# Patient Record
Sex: FEMALE | Race: BLACK OR AFRICAN AMERICAN | Employment: UNEMPLOYED | URBAN - METROPOLITAN AREA
[De-identification: names, ages, dates, MRNs, and addresses within clinical notes are randomized per-mention and may not be internally consistent; named-entity substitution may affect disease eponyms.]

---

## 2017-05-23 ENCOUNTER — HOSPITAL ENCOUNTER (EMERGENCY)
Age: 37
Discharge: HOME | End: 2017-05-23
Attending: EMERGENCY MEDICINE
Payer: MEDICAID

## 2017-05-23 ENCOUNTER — APPOINTMENT (OUTPATIENT)
Dept: CT IMAGING | Age: 37
End: 2017-05-23
Attending: EMERGENCY MEDICINE
Payer: MEDICAID

## 2017-05-23 VITALS
BODY MASS INDEX: 44.16 KG/M2 | WEIGHT: 240 LBS | SYSTOLIC BLOOD PRESSURE: 126 MMHG | HEART RATE: 73 BPM | RESPIRATION RATE: 16 BRPM | DIASTOLIC BLOOD PRESSURE: 71 MMHG | TEMPERATURE: 99.1 F | HEIGHT: 62 IN | OXYGEN SATURATION: 100 %

## 2017-05-23 DIAGNOSIS — G93.2 IIH (IDIOPATHIC INTRACRANIAL HYPERTENSION): ICD-10-CM

## 2017-05-23 DIAGNOSIS — G43.009 NONINTRACTABLE MIGRAINE, UNSPECIFIED MIGRAINE TYPE: Primary | ICD-10-CM

## 2017-05-23 LAB
ALBUMIN SERPL BCP-MCNC: 3.5 G/DL (ref 3.5–5)
ALBUMIN/GLOB SERPL: 0.9 {RATIO} (ref 1.1–2.2)
ALP SERPL-CCNC: 62 U/L (ref 45–117)
ALT SERPL-CCNC: 25 U/L (ref 12–78)
ANION GAP BLD CALC-SCNC: 5 MMOL/L (ref 5–15)
AST SERPL W P-5'-P-CCNC: 14 U/L (ref 15–37)
BASOPHILS # BLD AUTO: 0 K/UL (ref 0–0.1)
BASOPHILS # BLD: 0 % (ref 0–1)
BILIRUB SERPL-MCNC: 0.6 MG/DL (ref 0.2–1)
BUN SERPL-MCNC: 9 MG/DL (ref 6–20)
BUN/CREAT SERPL: 13 (ref 12–20)
CALCIUM SERPL-MCNC: 8.8 MG/DL (ref 8.5–10.1)
CHLORIDE SERPL-SCNC: 105 MMOL/L (ref 97–108)
CO2 SERPL-SCNC: 30 MMOL/L (ref 21–32)
CREAT SERPL-MCNC: 0.71 MG/DL (ref 0.55–1.02)
EOSINOPHIL # BLD: 0 K/UL (ref 0–0.4)
EOSINOPHIL NFR BLD: 1 % (ref 0–7)
ERYTHROCYTE [DISTWIDTH] IN BLOOD BY AUTOMATED COUNT: 13.3 % (ref 11.5–14.5)
GLOBULIN SER CALC-MCNC: 3.8 G/DL (ref 2–4)
GLUCOSE SERPL-MCNC: 90 MG/DL (ref 65–100)
HCT VFR BLD AUTO: 40.4 % (ref 35–47)
HGB BLD-MCNC: 13.3 G/DL (ref 11.5–16)
LYMPHOCYTES # BLD AUTO: 22 % (ref 12–49)
LYMPHOCYTES # BLD: 1.8 K/UL (ref 0.8–3.5)
MCH RBC QN AUTO: 30 PG (ref 26–34)
MCHC RBC AUTO-ENTMCNC: 32.9 G/DL (ref 30–36.5)
MCV RBC AUTO: 91.2 FL (ref 80–99)
MONOCYTES # BLD: 0.4 K/UL (ref 0–1)
MONOCYTES NFR BLD AUTO: 5 % (ref 5–13)
NEUTS SEG # BLD: 5.9 K/UL (ref 1.8–8)
NEUTS SEG NFR BLD AUTO: 72 % (ref 32–75)
PLATELET # BLD AUTO: 221 K/UL (ref 150–400)
POTASSIUM SERPL-SCNC: 3.8 MMOL/L (ref 3.5–5.1)
PROT SERPL-MCNC: 7.3 G/DL (ref 6.4–8.2)
RBC # BLD AUTO: 4.43 M/UL (ref 3.8–5.2)
SODIUM SERPL-SCNC: 140 MMOL/L (ref 136–145)
WBC # BLD AUTO: 8.1 K/UL (ref 3.6–11)

## 2017-05-23 PROCEDURE — 96375 TX/PRO/DX INJ NEW DRUG ADDON: CPT

## 2017-05-23 PROCEDURE — 36415 COLL VENOUS BLD VENIPUNCTURE: CPT | Performed by: EMERGENCY MEDICINE

## 2017-05-23 PROCEDURE — 74011250636 HC RX REV CODE- 250/636: Performed by: EMERGENCY MEDICINE

## 2017-05-23 PROCEDURE — 85025 COMPLETE CBC W/AUTO DIFF WBC: CPT | Performed by: EMERGENCY MEDICINE

## 2017-05-23 PROCEDURE — 96374 THER/PROPH/DIAG INJ IV PUSH: CPT

## 2017-05-23 PROCEDURE — 80053 COMPREHEN METABOLIC PANEL: CPT | Performed by: EMERGENCY MEDICINE

## 2017-05-23 PROCEDURE — 80201 ASSAY OF TOPIRAMATE: CPT | Performed by: EMERGENCY MEDICINE

## 2017-05-23 PROCEDURE — 99283 EMERGENCY DEPT VISIT LOW MDM: CPT

## 2017-05-23 PROCEDURE — 70450 CT HEAD/BRAIN W/O DYE: CPT

## 2017-05-23 RX ORDER — DIPHENHYDRAMINE HYDROCHLORIDE 50 MG/ML
25 INJECTION, SOLUTION INTRAMUSCULAR; INTRAVENOUS
Status: COMPLETED | OUTPATIENT
Start: 2017-05-23 | End: 2017-05-23

## 2017-05-23 RX ORDER — ACETAZOLAMIDE 500 MG/1
500 CAPSULE, EXTENDED RELEASE ORAL 2 TIMES DAILY
Qty: 20 CAP | Refills: 0 | Status: SHIPPED | OUTPATIENT
Start: 2017-05-23 | End: 2017-06-02

## 2017-05-23 RX ORDER — TOPIRAMATE 50 MG/1
50 TABLET, FILM COATED ORAL 2 TIMES DAILY
Qty: 20 TAB | Refills: 0 | Status: ON HOLD | OUTPATIENT
Start: 2017-05-23 | End: 2018-05-02

## 2017-05-23 RX ORDER — MORPHINE SULFATE 4 MG/ML
4 INJECTION, SOLUTION INTRAMUSCULAR; INTRAVENOUS
Status: COMPLETED | OUTPATIENT
Start: 2017-05-23 | End: 2017-05-23

## 2017-05-23 RX ORDER — SODIUM CHLORIDE 0.9 % (FLUSH) 0.9 %
5-10 SYRINGE (ML) INJECTION AS NEEDED
Status: DISCONTINUED | OUTPATIENT
Start: 2017-05-23 | End: 2017-05-23 | Stop reason: HOSPADM

## 2017-05-23 RX ORDER — ONDANSETRON 2 MG/ML
4 INJECTION INTRAMUSCULAR; INTRAVENOUS
Status: COMPLETED | OUTPATIENT
Start: 2017-05-23 | End: 2017-05-23

## 2017-05-23 RX ORDER — SODIUM CHLORIDE 0.9 % (FLUSH) 0.9 %
5-10 SYRINGE (ML) INJECTION EVERY 8 HOURS
Status: DISCONTINUED | OUTPATIENT
Start: 2017-05-23 | End: 2017-05-23 | Stop reason: HOSPADM

## 2017-05-23 RX ORDER — HYDROCODONE BITARTRATE AND ACETAMINOPHEN 5; 325 MG/1; MG/1
1 TABLET ORAL
Qty: 10 TAB | Refills: 0 | Status: SHIPPED | OUTPATIENT
Start: 2017-05-23 | End: 2017-10-05

## 2017-05-23 RX ADMIN — DIPHENHYDRAMINE HYDROCHLORIDE 25 MG: 50 INJECTION INTRAMUSCULAR; INTRAVENOUS at 14:47

## 2017-05-23 RX ADMIN — Medication 10 ML: at 14:48

## 2017-05-23 RX ADMIN — ONDANSETRON 4 MG: 2 INJECTION INTRAMUSCULAR; INTRAVENOUS at 14:44

## 2017-05-23 RX ADMIN — Medication 10 ML: at 14:51

## 2017-05-23 RX ADMIN — MORPHINE SULFATE 4 MG: 4 INJECTION, SOLUTION INTRAMUSCULAR; INTRAVENOUS at 14:44

## 2017-05-23 NOTE — ED NOTES
Patient has been instructed that they have been given Morphine which contains opioids, benzodiazepines, or other sedating drugs. Patient is aware that they  will need to refrain from driving or operating heavy machinery after taking this medication. Patient also instructed that they need to avoid drinking alcohol and using other products containing opioids, benzodiazepines, or other sedating drugs. Patient verbalized understanding.

## 2017-05-23 NOTE — ED NOTES
Emergency Department Nursing Plan of Care       The Nursing Plan of Care is developed from the Nursing assessment and Emergency Department Attending provider initial evaluation. The plan of care may be reviewed in the ED Provider note.     The Plan of Care was developed with the following considerations:   Patient / Family readiness to learn indicated by:verbalized understanding  Persons(s) to be included in education: patient  Barriers to Learning/Limitations:No    [address] Santos, RN    5/23/2017   2:06 PM

## 2017-05-23 NOTE — DISCHARGE INSTRUCTIONS
Migraine Headache: Care Instructions  Your Care Instructions  Migraines are painful, throbbing headaches that often start on one side of the head. They may cause nausea and vomiting and make you sensitive to light, sound, or smell. Without treatment, migraines can last from 4 hours to a few days. Medicines can help prevent migraines or stop them after they have started. Your doctor can help you find which ones work best for you. Follow-up care is a key part of your treatment and safety. Be sure to make and go to all appointments, and call your doctor if you are having problems. It's also a good idea to know your test results and keep a list of the medicines you take. How can you care for yourself at home? · Do not drive if you have taken a prescription pain medicine. · Rest in a quiet, dark room until your headache is gone. Close your eyes, and try to relax or go to sleep. Don't watch TV or read. · Put a cold, moist cloth or cold pack on the painful area for 10 to 20 minutes at a time. Put a thin cloth between the cold pack and your skin. · Use a warm, moist towel or a heating pad set on low to relax tight shoulder and neck muscles. · Have someone gently massage your neck and shoulders. · Take your medicines exactly as prescribed. Call your doctor if you think you are having a problem with your medicine. You will get more details on the specific medicines your doctor prescribes. · Be careful not to take pain medicine more often than the instructions allow. You could get worse or more frequent headaches when the medicine wears off. To prevent migraines  · Keep a headache diary so you can figure out what triggers your headaches. Avoiding triggers may help you prevent headaches. Record when each headache began, how long it lasted, and what the pain was like.  (Was it throbbing, aching, stabbing, or dull?) Write down any other symptoms you had with the headache, such as nausea, flashing lights or dark spots, or sensitivity to bright light or loud noise. Note if the headache occurred near your period. List anything that might have triggered the headache. Triggers may include certain foods (chocolate, cheese, wine) or odors, smoke, bright light, stress, or lack of sleep. · If your doctor has prescribed medicine for your migraines, take it as directed. You may have medicine that you take only when you get a migraine and medicine that you take all the time to help prevent migraines. ¨ If your doctor has prescribed medicine for when you get a headache, take it at the first sign of a migraine, unless your doctor has given you other instructions. ¨ If your doctor has prescribed medicine to prevent migraines, take it exactly as prescribed. Call your doctor if you think you are having a problem with your medicine. · Find healthy ways to deal with stress. Migraines are most common during or right after stressful times. Take time to relax before and after you do something that has caused a migraine in the past.  · Try to keep your muscles relaxed by keeping good posture. Check your jaw, face, neck, and shoulder muscles for tension. Try to relax them. When you sit at a desk, change positions often. And make sure to stretch for 30 seconds each hour. · Get plenty of sleep and exercise. · Eat meals on a regular schedule. Avoid foods and drinks that often trigger migraines. These include chocolate, alcohol (especially red wine and port), aspartame, monosodium glutamate (MSG), and some additives found in foods (such as hot dogs, tijer[place], cold cuts, aged cheeses, and pickled foods). · Limit caffeine. Don't drink too much coffee, tea, or soda. But don't quit caffeine suddenly. That can also give you migraines. · Do not smoke or allow others to smoke around you. If you need help quitting, talk to your doctor about stop-smoking programs and medicines. These can increase your chances of quitting for good.   · If you are taking birth control pills or hormone therapy, talk to your doctor about whether they are triggering your migraines. When should you call for help? Call 911 anytime you think you may need emergency care. For example, call if:  · You have signs of a stroke. These may include:  ¨ Sudden numbness, paralysis, or weakness in your face, arm, or leg, especially on only one side of your body. ¨ Sudden vision changes. ¨ Sudden trouble speaking. ¨ Sudden confusion or trouble understanding simple statements. ¨ Sudden problems with walking or balance. ¨ A sudden, severe headache that is different from past headaches. Call your doctor now or seek immediate medical care if:  · You have new or worse nausea and vomiting. · You have a new or higher fever. · Your headache gets much worse. Watch closely for changes in your health, and be sure to contact your doctor if:  · You are not getting better after 2 days (48 hours). Where can you learn more? Go to http://jesusProteoTechjanet.info/. Enter E674 in the search box to learn more about \"Migraine Headache: Care Instructions. \"  Current as of: October 14, 2016  Content Version: 11.2  © 3487-0432 [employer]. Care instructions adapted under license by [employer] (which disclaims liability or warranty for this information). If you have questions about a medical condition or this instruction, always ask your healthcare professional. [address] any warranty or liability for your use of this information. Learning About Pseudotumor Cerebri  What is pseudotumor cerebri? Pseudotumor cerebri (say \"okx-gjq-UGR-kunal SA--kasey\") is an increase in pressure of the fluid that surrounds the brain. Your doctor may also call the condition \"idiopathic intracranial hypertension. \" Normally, this clear fluid acts like a buffer to protect the brain. It is called cerebrospinal fluid, or CSF.  It's not clear what makes the CSF pressure rise. Some medicines may cause it. Sleep apnea, obesity, and anemia may also play a part. The pressure may build over time. The rising pressure can make the optic nerve swell. The optic nerve is at the back of the eye. It carries visual information from the eye to the brain. The swelling may damage the nerve and lead to permanent loss of some or all of the eyesight. There are several symptoms of rising CSF pressure. Some symptoms may be present all the time. Some might come and go. Symptoms include:  · Severe headaches. They sometimes come with nausea and vomiting. The pain may be centered behind the eyes. · A hissing or roaring sound in the ears that keeps time with your heartbeat. · Problems with vision. You may not be able to see well at the edge of your field of view. You may also lose center vision. It may happen now and then, in one or both eyes, and last for a few seconds at a time. The word \"pseudotumor\" may sound scary. But it's not a brain tumor. The condition gets its name because the pressure inside the skull can mimic what happens when a person has a tumor. How is it treated? · If you are taking medicines that could be raising your CSF pressure, your doctor may change your medicines. · You may get medicines to help your body make less CSF. This can help lower the pressure around the brain. · Your doctor may also give you medicine for headaches. · If sleep apnea, obesity, or anemia may be causing the CSF pressure to rise, your doctor may treat those problems. · If your vision is getting worse, you may have surgery to make a small opening on the optic nerve to reduce swelling. · Your doctor may implant small tubes inside the skull to drain the extra fluid. This helps lower the pressure around the brain. Follow-up care is a key part of your treatment and safety. Be sure to make and go to all appointments, and call your doctor if you are having problems.  It's also a good idea to know your test results and keep a list of the medicines you take. Where can you learn more? Go to http://jesus-janet.info/. Enter 545 [id number] [id number] in the search box to learn more about \"Learning About Pseudotumor Cerebri. \"  Current as of: May 23, 2016  Content Version: 11.2  © 8995-2433 [employer]. Care instructions adapted under license by [employer] (which disclaims liability or warranty for this information). If you have questions about a medical condition or this instruction, always ask your healthcare professional. [place] [address] any warranty or liability for your use of this information.

## 2017-05-23 NOTE — ED NOTES
Patient (s)  given copy of dc instructions and 3 script(s). Patient(s)  verbalized understanding of instructions and script (s). Patient given a current medication reconciliation form and verbalized understanding of their medications. Patient (s) verbalized understanding of the importance of discussing medications with  his or her physician or clinic when they follow up. Patient alert and oriented and in no acute distress. Pt verbalizes pain scale of 3 out of 10. Patient discharged home ambulatory with friend driving her.   Patient declined a wheelchair at discharge

## 2017-05-23 NOTE — ED PROVIDER NOTES
HPI Comments: John Javed is a 40 y.o. female with hx IIH, who presents ambulatory to the ED c/o worsening HA x 3 days. She reports associated dizziness, eye tearing, and photophobia. She states her current pain is similar to her hx of IIH HA. She has taken Tylenol for pain, without relief. She is followed by neurology at [facility], but could not schedule an appointment until tomorrow. She specifically denies any fevers, chills, nausea, vomiting, chest pain, shortness of breath, rash, diarrhea, sweating or weight loss. PCP: [address] [place] [address] MD Felipe  Soc Hx: -tobacco (former smoker), +EtOH    There are no other complaints, changes or physical findings at this time. The history is provided by the patient. Past Medical History:   Diagnosis Date    Anxiety     Hypertension     Other ill-defined conditions     Hydrocephalus - mild    Psychiatric disorder     Anxiety       Past Surgical History:   Procedure Laterality Date    HX  SECTION  ,03    x2    HX HEENT      LP to relieve right eye pressure    HX HYSTERECTOMY      Partial    HX LAPAROSCOPIC SUPRACERVICAL HYSTERECTOMY  14    HX OTHER SURGICAL  2013    [employer]    HX TUBAL LIGATION           Family History:   Problem Relation Age of Onset    Diabetes Maternal Grandmother     Stroke Maternal Grandmother        Social History     Social History    Marital status: SINGLE     Spouse name: N/A    Number of children: N/A    Years of education: N/A     Occupational History    Not on file.      Social History Main Topics    Smoking status: Former Smoker     Quit date: 3/27/2012    Smokeless tobacco: Not on file      Comment: two times weekly    Alcohol use No      Comment: 4/month    Drug use: No    Sexual activity: Yes     Partners: Male     Birth control/ protection: Surgical      Comment: Tubal Ligation     Other Topics Concern    Not on file     Social History Narrative         ALLERGIES: Review of patient's allergies indicates no known allergies. Review of Systems   Constitutional: Negative. Negative for activity change, appetite change, chills, fatigue, fever and unexpected weight change. HENT: Negative for congestion, hearing loss, rhinorrhea, sneezing and voice change. Eyes: Positive for photophobia. Negative for pain. +tearing   Respiratory: Negative. Negative for apnea, cough, choking, chest tightness and shortness of breath. Cardiovascular: Negative. Negative for chest pain and palpitations. Gastrointestinal: Negative. Negative for abdominal distention, abdominal pain, blood in stool, diarrhea, nausea and vomiting. Genitourinary: Negative. Negative for difficulty urinating, flank pain, frequency and urgency. No discharge   Musculoskeletal: Negative. Negative for arthralgias, back pain, myalgias and neck stiffness. Skin: Negative. Negative for color change and rash. Neurological: Positive for dizziness and headaches. Negative for seizures, syncope, speech difficulty, weakness and numbness. Hematological: Negative for adenopathy. Psychiatric/Behavioral: Negative. Negative for agitation, behavioral problems, dysphoric mood and suicidal ideas. The patient is not nervous/anxious. All other systems reviewed and are negative. Vitals:    05/23/17 1313   BP: 117/69   Pulse: 97   Resp: 18   Temp: 99.1 °F (37.3 °C)   SpO2: 96%   Weight: 108.9 kg (240 lb)   Height: 5' 2\" (1.575 m)            Physical Exam   Constitutional: She is oriented to person, place, and time. She appears well-developed and well-nourished. HENT:   Head: Normocephalic and atraumatic. Mouth/Throat: Oropharynx is clear and moist. No oropharyngeal exudate. Eyes: Conjunctivae and EOM are normal. Pupils are equal, round, and reactive to light. Right eye exhibits no discharge. Left eye exhibits no discharge. Neck: Normal range of motion. Neck supple.    Cardiovascular: Normal rate, regular rhythm and intact distal pulses. Exam reveals no gallop and no friction rub. No murmur heard. Pulmonary/Chest: Effort normal and breath sounds normal. No respiratory distress. She has no wheezes. She has no rales. She exhibits no tenderness. Abdominal: Soft. Bowel sounds are normal. She exhibits no distension and no mass. There is no tenderness. There is no rebound and no guarding. Musculoskeletal: Normal range of motion. She exhibits no edema. Lymphadenopathy:     She has no cervical adenopathy. Neurological: She is alert and oriented to person, place, and time. No cranial nerve deficit. Coordination normal.   Skin: Skin is warm and dry. No rash noted. No erythema. Psychiatric: Her mood appears anxious. Nursing note and vitals reviewed. MDM  Number of Diagnoses or Management Options  IIH (idiopathic intracranial hypertension):   Nonintractable migraine, unspecified migraine type:   Diagnosis management comments: Migraine HA, anxiety, IIH       Amount and/or Complexity of Data Reviewed  Clinical lab tests: ordered and reviewed  Tests in the radiology section of CPT®: ordered and reviewed  Review and summarize past medical records: yes  Independent visualization of images, tracings, or specimens: yes      ED Course     PROGRESS NOTE:  4:00 PM  Pt reevaluated. Pt given medications for hx of IIH, reports sx have improved. Pt advised to discuss with neurology in the morning about any new medications to start. Written by Beatrice Coley ED Scribe, as dictated by Floridalma Campos MD    Procedures    LABORATORY TESTS:  Recent Results (from the past 12 hour(s))   CBC WITH AUTOMATED DIFF    Collection Time: 05/23/17  2:39 PM   Result Value Ref Range    WBC 8.1 3.6 - 11.0 K/uL    RBC 4.43 3.80 - 5.20 M/uL    HGB 13.3 11.5 - 16.0 g/dL    HCT 40.4 35.0 - 47.0 %    MCV 91.2 80.0 - 99.0 FL    MCH 30.0 26.0 - 34.0 PG    MCHC 32.9 30.0 - 36.5 g/dL    RDW 13.3 11.5 - 14.5 %    PLATELET 182 764 - 263 K/uL NEUTROPHILS 72 32 - 75 %    LYMPHOCYTES 22 12 - 49 %    MONOCYTES 5 5 - 13 %    EOSINOPHILS 1 0 - 7 %    BASOPHILS 0 0 - 1 %    ABS. NEUTROPHILS 5.9 1.8 - 8.0 K/UL    ABS. LYMPHOCYTES 1.8 0.8 - 3.5 K/UL    ABS. MONOCYTES 0.4 0.0 - 1.0 K/UL    ABS. EOSINOPHILS 0.0 0.0 - 0.4 K/UL    ABS. BASOPHILS 0.0 0.0 - 0.1 K/UL   METABOLIC PANEL, COMPREHENSIVE    Collection Time: 05/23/17  2:39 PM   Result Value Ref Range    Sodium 140 136 - 145 mmol/L    Potassium 3.8 3.5 - 5.1 mmol/L    Chloride 105 97 - 108 mmol/L    CO2 30 21 - 32 mmol/L    Anion gap 5 5 - 15 mmol/L    Glucose 90 65 - 100 mg/dL    BUN 9 6 - 20 MG/DL    Creatinine 0.71 0.55 - 1.02 MG/DL    BUN/Creatinine ratio 13 12 - 20      GFR est AA >60 >60 ml/min/1.73m2    GFR est non-AA >60 >60 ml/min/1.73m2    Calcium 8.8 8.5 - 10.1 MG/DL    Bilirubin, total 0.6 0.2 - 1.0 MG/DL    ALT (SGPT) 25 12 - 78 U/L    AST (SGOT) 14 (L) 15 - 37 U/L    Alk. phosphatase 62 45 - 117 U/L    Protein, total 7.3 6.4 - 8.2 g/dL    Albumin 3.5 3.5 - 5.0 g/dL    Globulin 3.8 2.0 - 4.0 g/dL    A-G Ratio 0.9 (L) 1.1 - 2.2         IMAGING RESULTS:  CT HEAD WO CONT   Final Result   CT Results  (Last 48 hours)               05/23/17 1503  CT HEAD WO CONT Final result    Impression:  IMPRESSION: No acute findings. Narrative:  EXAM:  CT HEAD WO CONT       INDICATION:   headache, 3 day history, dizziness, history of idiopathic   intracranial hypertension, photophobia, visual disturbance       COMPARISON: None. TECHNIQUE: Unenhanced CT of the head was performed using 5 mm images. Brain and   bone windows were generated. CT dose reduction was achieved through use of a   standardized protocol tailored for this examination and automatic exposure   control for dose modulation. FINDINGS:   The ventricles and sulci are normal in size, shape and configuration and   midline. There is no significant white matter disease.  There is no intracranial   hemorrhage, extra-axial collection, mass, mass effect or midline shift. The   basilar cisterns are open. No acute infarct is identified. The bone windows   demonstrate no abnormalities. The visualized portions of the paranasal sinuses   and mastoid air cells are clear. The patient has an empty sella, which would   correlate with her history of idiopathic intracranial hypertension. Gevena Celine MEDICATIONS GIVEN:  Medications   sodium chloride (NS) flush 5-10 mL (10 mL IntraVENous Given 17 1451)   sodium chloride (NS) flush 5-10 mL (10 mL IntraVENous Canceled Entry 17 1449)   diphenhydrAMINE (BENADRYL) injection 25 mg (25 mg IntraVENous Given 17 1447)   morphine injection 4 mg (4 mg IntraVENous Given 17 1444)   ondansetron (ZOFRAN) injection 4 mg (4 mg IntraVENous Given 17 1444)       IMPRESSION:  1. Nonintractable migraine, unspecified migraine type    2. IIH (idiopathic intracranial hypertension)        PLAN:  1. Current Discharge Medication List      START taking these medications    Details   acetaZOLAMIDE SR (DIAMOX SEQUELS) 500 mg capsule Take 1 Cap by mouth two (2) times a day for 10 days. Qty: 20 Cap, Refills: 0      HYDROcodone-acetaminophen (NORCO) 5-325 mg per tablet Take 1 Tab by mouth every six (6) hours as needed for Pain. Max Daily Amount: 4 Tabs. Qty: 10 Tab, Refills: 0         CONTINUE these medications which have CHANGED    Details   topiramate (TOPAMAX) 50 mg tablet Take 1 Tab by mouth two (2) times a day for 10 days.   Qty: 20 Tab, Refills: 0         STOP taking these medications       ibuprofen 100 mg tablet Comments:   Reason for Stoppin.   Follow-up Information     Follow up With Details Comments [facility] In 1 day   [facility] [address]    [facility]  As needed, If symptoms worsen [address]        Return to ED if worse     DISCHARGE NOTE  4:03 PM  The patient has been re-evaluated and is ready for discharge. Reviewed available results, diagnosis, and discharge instructions with patient. Pt has conveyed understanding and agreement with the diagnosis and plan. Pt agrees to F/U as recommended, or return to the ED if their sxs worsen. Written by Lindsey Acevedo, ED Scribe, as dictated by Angel Handy MD.    This note is prepared by Lindsey Acevedo acting as Scribe for Angel Gunderson. Alexandre Handy, [address] [place] [address]. Alexandre Handy MD: The scribe's documentation has been prepared under my direction and personally reviewed by me in its entirety. I confirm that the note above accurately reflects all work, treatment, procedures, and medical decision making performed by me.

## 2017-05-25 LAB — TOPIRAMATE SERPL-MCNC: NORMAL UG/ML (ref 2–25)

## 2017-10-05 ENCOUNTER — HOSPITAL ENCOUNTER (EMERGENCY)
Age: 37
Discharge: HOME | End: 2017-10-05
Attending: EMERGENCY MEDICINE | Admitting: EMERGENCY MEDICINE
Payer: MEDICAID

## 2017-10-05 VITALS
TEMPERATURE: 99.1 F | SYSTOLIC BLOOD PRESSURE: 125 MMHG | WEIGHT: 239.5 LBS | OXYGEN SATURATION: 100 % | DIASTOLIC BLOOD PRESSURE: 76 MMHG | HEART RATE: 80 BPM | BODY MASS INDEX: 44.07 KG/M2 | RESPIRATION RATE: 16 BRPM | HEIGHT: 62 IN

## 2017-10-05 DIAGNOSIS — R51.9 NONINTRACTABLE EPISODIC HEADACHE, UNSPECIFIED HEADACHE TYPE: Primary | ICD-10-CM

## 2017-10-05 LAB
AMPHET UR QL SCN: NEGATIVE
APPEARANCE UR: CLEAR
BACTERIA URNS QL MICRO: NEGATIVE /HPF
BARBITURATES UR QL SCN: NEGATIVE
BENZODIAZ UR QL: NEGATIVE
BILIRUB UR QL: NEGATIVE
CANNABINOIDS UR QL SCN: NEGATIVE
COCAINE UR QL SCN: NEGATIVE
COLOR UR: ABNORMAL
DRUG SCRN COMMENT,DRGCM: NORMAL
EPITH CASTS URNS QL MICRO: ABNORMAL /LPF
GLUCOSE UR STRIP.AUTO-MCNC: NEGATIVE MG/DL
HCG UR QL: NEGATIVE
HGB UR QL STRIP: NEGATIVE
KETONES UR QL STRIP.AUTO: 40 MG/DL
LEUKOCYTE ESTERASE UR QL STRIP.AUTO: NEGATIVE
METHADONE UR QL: NEGATIVE
NITRITE UR QL STRIP.AUTO: NEGATIVE
OPIATES UR QL: NEGATIVE
PCP UR QL: NEGATIVE
PH UR STRIP: 5.5 [PH] (ref 5–8)
PROT UR STRIP-MCNC: NEGATIVE MG/DL
RBC #/AREA URNS HPF: ABNORMAL /HPF (ref 0–5)
SP GR UR REFRACTOMETRY: 1.02 (ref 1–1.03)
UA: UC IF INDICATED,UAUC: ABNORMAL
UROBILINOGEN UR QL STRIP.AUTO: 0.2 EU/DL (ref 0.2–1)
WBC URNS QL MICRO: ABNORMAL /HPF (ref 0–4)

## 2017-10-05 PROCEDURE — 99284 EMERGENCY DEPT VISIT MOD MDM: CPT

## 2017-10-05 PROCEDURE — 81001 URINALYSIS AUTO W/SCOPE: CPT | Performed by: EMERGENCY MEDICINE

## 2017-10-05 PROCEDURE — 74011250636 HC RX REV CODE- 250/636: Performed by: EMERGENCY MEDICINE

## 2017-10-05 PROCEDURE — 81025 URINE PREGNANCY TEST: CPT

## 2017-10-05 PROCEDURE — 80307 DRUG TEST PRSMV CHEM ANLYZR: CPT | Performed by: EMERGENCY MEDICINE

## 2017-10-05 PROCEDURE — 96375 TX/PRO/DX INJ NEW DRUG ADDON: CPT

## 2017-10-05 PROCEDURE — 96374 THER/PROPH/DIAG INJ IV PUSH: CPT

## 2017-10-05 PROCEDURE — 96361 HYDRATE IV INFUSION ADD-ON: CPT

## 2017-10-05 RX ORDER — PROCHLORPERAZINE EDISYLATE 5 MG/ML
10 INJECTION INTRAMUSCULAR; INTRAVENOUS
Status: COMPLETED | OUTPATIENT
Start: 2017-10-05 | End: 2017-10-05

## 2017-10-05 RX ORDER — BUTALBITAL, ACETAMINOPHEN AND CAFFEINE 300; 40; 50 MG/1; MG/1; MG/1
1 CAPSULE ORAL
Qty: 12 CAP | Refills: 0 | Status: ON HOLD | OUTPATIENT
Start: 2017-10-05 | End: 2018-05-02

## 2017-10-05 RX ORDER — SODIUM CHLORIDE 0.9 % (FLUSH) 0.9 %
5-10 SYRINGE (ML) INJECTION AS NEEDED
Status: DISCONTINUED | OUTPATIENT
Start: 2017-10-05 | End: 2017-10-05 | Stop reason: HOSPADM

## 2017-10-05 RX ORDER — SODIUM CHLORIDE 0.9 % (FLUSH) 0.9 %
5-10 SYRINGE (ML) INJECTION EVERY 8 HOURS
Status: DISCONTINUED | OUTPATIENT
Start: 2017-10-05 | End: 2017-10-05 | Stop reason: HOSPADM

## 2017-10-05 RX ORDER — KETOROLAC TROMETHAMINE 30 MG/ML
30 INJECTION, SOLUTION INTRAMUSCULAR; INTRAVENOUS
Status: COMPLETED | OUTPATIENT
Start: 2017-10-05 | End: 2017-10-05

## 2017-10-05 RX ORDER — DIPHENHYDRAMINE HYDROCHLORIDE 50 MG/ML
12.5 INJECTION, SOLUTION INTRAMUSCULAR; INTRAVENOUS
Status: COMPLETED | OUTPATIENT
Start: 2017-10-05 | End: 2017-10-05

## 2017-10-05 RX ADMIN — SODIUM CHLORIDE 1000 ML: 900 INJECTION, SOLUTION INTRAVENOUS at 14:16

## 2017-10-05 RX ADMIN — Medication 10 ML: at 15:12

## 2017-10-05 RX ADMIN — KETOROLAC TROMETHAMINE 30 MG: 30 INJECTION, SOLUTION INTRAMUSCULAR at 14:16

## 2017-10-05 RX ADMIN — DIPHENHYDRAMINE HYDROCHLORIDE 12.5 MG: 50 INJECTION INTRAMUSCULAR; INTRAVENOUS at 15:10

## 2017-10-05 RX ADMIN — PROCHLORPERAZINE EDISYLATE 10 MG: 5 INJECTION INTRAMUSCULAR; INTRAVENOUS at 15:12

## 2017-10-05 RX ADMIN — Medication 10 ML: at 15:10

## 2017-10-05 NOTE — ED PROVIDER NOTES
[facility]  EMERGENCY DEPARTMENT HISTORY AND PHYSICAL EXAM         Date of Service: 10/5/2017   Patient Name: Lashawn David   YOB: 1980  Medical Record Number: [id number]    History of Presenting Illness     Chief Complaint   Patient presents with    Generalized Body Aches     with dizziness x 4 days        History Provided By:  patient    Additional History:   Lashawn aDvid is a 40 y.o. female with PMhx significant for anxiety, HTN, and migraines who presents ambulatory to the ED with cc of progressively worsening HA x ~4 days. She also c/o aching lower back pain, left ear pain, mild nausea, and intermittent lightheadedness since onset. Pt states her symptoms are consistent with past episodes of migraine. She states she has been taking OTC medications without improvement in her symptoms. She denies taking any medications on a regular basis for hx of migraines. Pt denies specific trauma or injury to which her symptoms might be attributed. She denies sick contacts. Pt specifically denies fever, chills, neck pain, and neck stiffness. Social Hx: (former) Tobacco, - EtOH, - Illicit Drugs    There are no other complaints, changes or physical findings at this time.     Primary Care Provider: Sayda Camacho MD     Past History     Past Medical History:   Past Medical History:   Diagnosis Date    Anxiety     Hypertension     Other ill-defined conditions(799.89)     Hydrocephalus - mild    Psychiatric disorder     Anxiety        Past Surgical History:   Past Surgical History:   Procedure Laterality Date    HX  SECTION  ,03    x2    HX HEENT      LP to relieve right eye pressure    HX HYSTERECTOMY      Partial    HX LAPAROSCOPIC SUPRACERVICAL HYSTERECTOMY  14    HX OTHER SURGICAL  2013    [employer]    HX TUBAL LIGATION          Family History:   Family History   Problem Relation Age of Onset    Diabetes Maternal Grandmother     Stroke Maternal Grandmother         Social History:   Social History   Substance Use Topics    Smoking status: Former Smoker     Quit date: 3/27/2012    Smokeless tobacco: None      Comment: two times weekly    Alcohol use No      Comment: 4/month        Allergies:   No Known Allergies     Review of Systems   Review of Systems   Constitutional: Negative for chills, diaphoresis, fever and unexpected weight change. HENT: Positive for ear pain (left). Negative for rhinorrhea and sore throat. Eyes: Negative for pain. Respiratory: Negative for shortness of breath, wheezing and stridor. Cardiovascular: Negative for chest pain and leg swelling. Gastrointestinal: Positive for nausea (mild). Negative for abdominal pain, blood in stool, diarrhea and vomiting. Genitourinary: Negative for difficulty urinating, dysuria and flank pain. Musculoskeletal: Positive for back pain (lower). Negative for neck pain and neck stiffness. Skin: Negative for rash. Neurological: Positive for light-headedness (intermittent) and headaches. Negative for seizures, syncope and weakness. Psychiatric/Behavioral: Negative for confusion. Physical Exam  Physical Exam   Constitutional: She is oriented to person, place, and time. She appears well-developed and well-nourished. No distress. Speaking in full sentences without difficulty or interruption   HENT:   Right Ear: Tympanic membrane normal.   Left Ear: Tympanic membrane normal.   Nose: Nose normal.   Mouth/Throat: Oropharynx is clear and moist and mucous membranes are normal. No oropharyngeal exudate, posterior oropharyngeal edema or posterior oropharyngeal erythema. Eyes: Conjunctivae and EOM are normal. Pupils are equal, round, and reactive to light. Right eye exhibits no discharge. Left eye exhibits no discharge. No scleral icterus. Neck: Normal range of motion. Neck supple. No JVD present. No Brudzinski's sign and no Kernig's sign noted.    Cardiovascular: Normal rate, regular rhythm, normal heart sounds and intact distal pulses. No murmur heard. Pulmonary/Chest: Effort normal and breath sounds normal. No stridor. No respiratory distress. She has no wheezes. She has no rales. Abdominal: Soft. Bowel sounds are normal. She exhibits no distension. There is no tenderness. There is no rebound and no guarding. Musculoskeletal: She exhibits no edema or tenderness. Tenderness over right trapezius   Neurological: She is alert and oriented to person, place, and time. Skin: Skin is warm and dry. No rash noted. She is not diaphoretic. Psychiatric: She has a normal mood and affect. Nursing note and vitals reviewed. Medical Decision Making   I am the first provider for this patient. I reviewed the vital signs, available nursing notes, past medical history, past surgical history, family history and social history. ED Course:  1:46 PM   Initial assessment performed. The patients presenting problems have been discussed, and they are in agreement with the care plan formulated and outlined with them. I have encouraged them to ask questions as they arise throughout their visit.     Diagnostic Study Results   Labs -      Recent Results (from the past 12 hour(s))   DRUG SCREEN, URINE    Collection Time: 10/05/17  2:11 PM   Result Value Ref Range    AMPHETAMINES NEGATIVE  NEG      BARBITURATES NEGATIVE  NEG      BENZODIAZEPINES NEGATIVE  NEG      COCAINE NEGATIVE  NEG      METHADONE NEGATIVE  NEG      OPIATES NEGATIVE  NEG      PCP(PHENCYCLIDINE) NEGATIVE  NEG      THC (TH-CANNABINOL) NEGATIVE  NEG      Drug screen comment (NOTE)    URINALYSIS W/ REFLEX CULTURE    Collection Time: 10/05/17  2:11 PM   Result Value Ref Range    Color YELLOW/STRAW      Appearance CLEAR CLEAR      Specific gravity 1.025 1.003 - 1.030      pH (UA) 5.5 5.0 - 8.0      Protein NEGATIVE  NEG mg/dL    Glucose NEGATIVE  NEG mg/dL    Ketone 40 (A) NEG mg/dL    Bilirubin NEGATIVE  NEG      Blood NEGATIVE  NEG      Urobilinogen 0.2 0.2 - 1.0 EU/dL    Nitrites NEGATIVE  NEG      Leukocyte Esterase NEGATIVE  NEG      WBC 0-4 0 - 4 /hpf    RBC 0-5 0 - 5 /hpf    Epithelial cells FEW FEW /lpf    Bacteria NEGATIVE  NEG /hpf    UA:UC IF INDICATED CULTURE NOT INDICATED BY UA RESULT CNI     HCG URINE, QL. - POC    Collection Time: 10/05/17  2:27 PM   Result Value Ref Range    Pregnancy test,urine (POC) NEGATIVE  NEG         Vital Signs-Reviewed the patient's vital signs. Patient Vitals for the past 12 hrs:   Temp Pulse Resp BP SpO2   10/05/17 1519 - 80 16 125/76 100 %   10/05/17 1325 - - - 127/78 -   10/05/17 1321 99.1 °F (37.3 °C) 80 16 (!) 118/102 98 %       Medications Given in the ED:  Medications   sodium chloride 0.9 % bolus infusion 1,000 mL (0 mL IntraVENous IV Completed 10/5/17 1529)   sodium chloride (NS) flush 5-10 mL (not administered)   sodium chloride (NS) flush 5-10 mL (10 mL IntraVENous Given 10/5/17 1512)   ketorolac (TORADOL) injection 30 mg (30 mg IntraVENous Given 10/5/17 1416)   prochlorperazine (COMPAZINE) injection 10 mg (10 mg IntraVENous Given 10/5/17 1512)   diphenhydrAMINE (BENADRYL) injection 12.5 mg (12.5 mg IntraVENous Given 10/5/17 1510)       Diagnosis:  Clinical Impression:   1. Nonintractable episodic headache, unspecified headache type         Plan:  1:   Follow-up Information     Follow up With Details Comments [address] Dolores Osullivan MD Schedule an appointment as soon as possible for a visit in 2 days  [address] [place] [address]  [phone]            2:   Current Discharge Medication List      START taking these medications    Details   butalbital-acetaminophen-caff (FIORICET) -40 mg per capsule Take 1 Cap by mouth every four (4) hours as needed for Pain. Max Daily Amount: 6 Caps.   Qty: 12 Cap, Refills: 0         STOP taking these medications       HYDROcodone-acetaminophen (NORCO) 5-325 mg per tablet Comments:   Reason for Stopping: Return to ED if worse. Disposition:  DISCHARGE NOTE:  3:50 PM  The patient is ready for discharge. The patients signs, symptoms, diagnosis, and instructions for discharge have been discussed and the pt has conveyed their understanding. The patient is to follow up as recommended or return to the ER should their symptoms worsen. Plan has been discussed and patient has conveyed their agreement.    _______________________________   Attestations: This note is prepared by Dionne Milligan, acting as Scribe for [employer]. MD Rosa.    [employer]. MD Rosa: The scribe's documentation has been prepared under my direction and personally reviewed by me in its entirety.  I confirm that the note above accurately reflects all work, treatment, procedures, and medical decision making performed by me.  _______________________________

## 2017-10-05 NOTE — DISCHARGE INSTRUCTIONS

## 2017-10-05 NOTE — ED NOTES
Patient tolerated crackers and ginger ale. Discharge instructions reviewed with patient. Patient given one prescription.   Patient able to verbalize events which would require immediate follow up

## 2017-12-20 ENCOUNTER — HOSPITAL ENCOUNTER (EMERGENCY)
Age: 37
Discharge: HOME | End: 2017-12-20
Attending: EMERGENCY MEDICINE
Payer: MEDICAID

## 2017-12-20 VITALS
HEART RATE: 95 BPM | BODY MASS INDEX: 44.16 KG/M2 | RESPIRATION RATE: 16 BRPM | OXYGEN SATURATION: 100 % | WEIGHT: 240 LBS | DIASTOLIC BLOOD PRESSURE: 55 MMHG | SYSTOLIC BLOOD PRESSURE: 107 MMHG | TEMPERATURE: 98.4 F | HEIGHT: 62 IN

## 2017-12-20 DIAGNOSIS — R07.9 CHEST PAIN, UNSPECIFIED TYPE: ICD-10-CM

## 2017-12-20 DIAGNOSIS — N30.00 ACUTE CYSTITIS WITHOUT HEMATURIA: Primary | ICD-10-CM

## 2017-12-20 LAB
ALBUMIN SERPL-MCNC: 3.8 G/DL (ref 3.5–5)
ALBUMIN/GLOB SERPL: 1 {RATIO} (ref 1.1–2.2)
ALP SERPL-CCNC: 58 U/L (ref 45–117)
ALT SERPL-CCNC: 22 U/L (ref 12–78)
ANION GAP SERPL CALC-SCNC: 9 MMOL/L (ref 5–15)
APPEARANCE UR: CLEAR
AST SERPL-CCNC: 14 U/L (ref 15–37)
BACTERIA URNS QL MICRO: ABNORMAL /HPF
BASOPHILS # BLD: 0 K/UL (ref 0–0.1)
BASOPHILS NFR BLD: 0 % (ref 0–1)
BILIRUB SERPL-MCNC: 0.5 MG/DL (ref 0.2–1)
BILIRUB UR QL: NEGATIVE
BUN SERPL-MCNC: 12 MG/DL (ref 6–20)
BUN/CREAT SERPL: 14 (ref 12–20)
CALCIUM SERPL-MCNC: 9.1 MG/DL (ref 8.5–10.1)
CHLORIDE SERPL-SCNC: 103 MMOL/L (ref 97–108)
CO2 SERPL-SCNC: 28 MMOL/L (ref 21–32)
COLOR UR: ABNORMAL
CREAT SERPL-MCNC: 0.88 MG/DL (ref 0.55–1.02)
EOSINOPHIL # BLD: 0 K/UL (ref 0–0.4)
EOSINOPHIL NFR BLD: 1 % (ref 0–7)
EPITH CASTS URNS QL MICRO: ABNORMAL /LPF
ERYTHROCYTE [DISTWIDTH] IN BLOOD BY AUTOMATED COUNT: 12.9 % (ref 11.5–14.5)
GLOBULIN SER CALC-MCNC: 4 G/DL (ref 2–4)
GLUCOSE SERPL-MCNC: 94 MG/DL (ref 65–100)
GLUCOSE UR STRIP.AUTO-MCNC: NEGATIVE MG/DL
HCT VFR BLD AUTO: 41.9 % (ref 35–47)
HGB BLD-MCNC: 13.5 G/DL (ref 11.5–16)
HGB UR QL STRIP: NEGATIVE
KETONES UR QL STRIP.AUTO: 40 MG/DL
LACTATE SERPL-SCNC: 0.8 MMOL/L (ref 0.4–2)
LEUKOCYTE ESTERASE UR QL STRIP.AUTO: ABNORMAL
LYMPHOCYTES # BLD: 2.4 K/UL (ref 0.8–3.5)
LYMPHOCYTES NFR BLD: 35 % (ref 12–49)
MCH RBC QN AUTO: 29.9 PG (ref 26–34)
MCHC RBC AUTO-ENTMCNC: 32.2 G/DL (ref 30–36.5)
MCV RBC AUTO: 92.7 FL (ref 80–99)
MONOCYTES # BLD: 0.5 K/UL (ref 0–1)
MONOCYTES NFR BLD: 8 % (ref 5–13)
NEUTS SEG # BLD: 3.8 K/UL (ref 1.8–8)
NEUTS SEG NFR BLD: 56 % (ref 32–75)
NITRITE UR QL STRIP.AUTO: NEGATIVE
PH UR STRIP: 6 [PH] (ref 5–8)
PLATELET # BLD AUTO: 216 K/UL (ref 150–400)
POTASSIUM SERPL-SCNC: 3.8 MMOL/L (ref 3.5–5.1)
PROT SERPL-MCNC: 7.8 G/DL (ref 6.4–8.2)
PROT UR STRIP-MCNC: NEGATIVE MG/DL
RBC # BLD AUTO: 4.52 M/UL (ref 3.8–5.2)
RBC #/AREA URNS HPF: ABNORMAL /HPF (ref 0–5)
SODIUM SERPL-SCNC: 140 MMOL/L (ref 136–145)
SP GR UR REFRACTOMETRY: 1.03 (ref 1–1.03)
TROPONIN I BLD-MCNC: <0.04 NG/ML (ref 0–0.08)
UA: UC IF INDICATED,UAUC: ABNORMAL
UROBILINOGEN UR QL STRIP.AUTO: 0.2 EU/DL (ref 0.2–1)
WBC # BLD AUTO: 6.7 K/UL (ref 3.6–11)
WBC URNS QL MICRO: ABNORMAL /HPF (ref 0–4)

## 2017-12-20 PROCEDURE — 87086 URINE CULTURE/COLONY COUNT: CPT | Performed by: PHYSICIAN ASSISTANT

## 2017-12-20 PROCEDURE — 96375 TX/PRO/DX INJ NEW DRUG ADDON: CPT

## 2017-12-20 PROCEDURE — 85025 COMPLETE CBC W/AUTO DIFF WBC: CPT | Performed by: PHYSICIAN ASSISTANT

## 2017-12-20 PROCEDURE — 96374 THER/PROPH/DIAG INJ IV PUSH: CPT

## 2017-12-20 PROCEDURE — 74011250636 HC RX REV CODE- 250/636: Performed by: PHYSICIAN ASSISTANT

## 2017-12-20 PROCEDURE — 83605 ASSAY OF LACTIC ACID: CPT | Performed by: PHYSICIAN ASSISTANT

## 2017-12-20 PROCEDURE — 81001 URINALYSIS AUTO W/SCOPE: CPT | Performed by: PHYSICIAN ASSISTANT

## 2017-12-20 PROCEDURE — 80053 COMPREHEN METABOLIC PANEL: CPT | Performed by: PHYSICIAN ASSISTANT

## 2017-12-20 PROCEDURE — 96361 HYDRATE IV INFUSION ADD-ON: CPT

## 2017-12-20 PROCEDURE — 93005 ELECTROCARDIOGRAM TRACING: CPT

## 2017-12-20 PROCEDURE — 99284 EMERGENCY DEPT VISIT MOD MDM: CPT

## 2017-12-20 PROCEDURE — 36415 COLL VENOUS BLD VENIPUNCTURE: CPT | Performed by: PHYSICIAN ASSISTANT

## 2017-12-20 PROCEDURE — 84484 ASSAY OF TROPONIN QUANT: CPT

## 2017-12-20 RX ORDER — CEPHALEXIN 500 MG/1
500 CAPSULE ORAL 2 TIMES DAILY
Qty: 14 CAP | Refills: 0 | Status: SHIPPED | OUTPATIENT
Start: 2017-12-20 | End: 2017-12-20

## 2017-12-20 RX ORDER — SODIUM CHLORIDE 0.9 % (FLUSH) 0.9 %
5-10 SYRINGE (ML) INJECTION EVERY 8 HOURS
Status: DISCONTINUED | OUTPATIENT
Start: 2017-12-20 | End: 2017-12-20 | Stop reason: HOSPADM

## 2017-12-20 RX ORDER — NAPROXEN 500 MG/1
500 TABLET ORAL 2 TIMES DAILY WITH MEALS
Qty: 20 TAB | Refills: 0 | Status: SHIPPED | OUTPATIENT
Start: 2017-12-20 | End: 2017-12-20

## 2017-12-20 RX ORDER — ONDANSETRON 2 MG/ML
4 INJECTION INTRAMUSCULAR; INTRAVENOUS
Status: COMPLETED | OUTPATIENT
Start: 2017-12-20 | End: 2017-12-20

## 2017-12-20 RX ORDER — CEPHALEXIN 500 MG/1
500 CAPSULE ORAL 2 TIMES DAILY
Qty: 14 CAP | Refills: 0 | Status: SHIPPED | OUTPATIENT
Start: 2017-12-20 | End: 2017-12-27

## 2017-12-20 RX ORDER — KETOROLAC TROMETHAMINE 30 MG/ML
30 INJECTION, SOLUTION INTRAMUSCULAR; INTRAVENOUS
Status: COMPLETED | OUTPATIENT
Start: 2017-12-20 | End: 2017-12-20

## 2017-12-20 RX ORDER — NAPROXEN 500 MG/1
500 TABLET ORAL 2 TIMES DAILY WITH MEALS
Qty: 20 TAB | Refills: 0 | Status: ON HOLD | OUTPATIENT
Start: 2017-12-20 | End: 2018-05-02

## 2017-12-20 RX ORDER — SODIUM CHLORIDE 0.9 % (FLUSH) 0.9 %
5-10 SYRINGE (ML) INJECTION AS NEEDED
Status: DISCONTINUED | OUTPATIENT
Start: 2017-12-20 | End: 2017-12-20 | Stop reason: HOSPADM

## 2017-12-20 RX ADMIN — KETOROLAC TROMETHAMINE 30 MG: 30 INJECTION, SOLUTION INTRAMUSCULAR at 15:04

## 2017-12-20 RX ADMIN — ONDANSETRON 4 MG: 2 INJECTION, SOLUTION INTRAMUSCULAR; INTRAVENOUS at 15:04

## 2017-12-20 RX ADMIN — SODIUM CHLORIDE 1000 ML: 900 INJECTION, SOLUTION INTRAVENOUS at 15:05

## 2017-12-20 NOTE — DISCHARGE INSTRUCTIONS
Chest Pain: Care Instructions  Your Care Instructions    There are many things that can cause chest pain. Some are not serious and will get better on their own in a few days. But some kinds of chest pain need more testing and treatment. Your doctor may have recommended a follow-up visit in the next 8 to 12 hours. If you are not getting better, you may need more tests or treatment. Even though your doctor has released you, you still need to watch for any problems. The doctor carefully checked you, but sometimes problems can develop later. If you have new symptoms or if your symptoms do not get better, get medical care right away. If you have worse or different chest pain or pressure that lasts more than 5 minutes or you passed out (lost consciousness), call 911 or seek other emergency help right away. A medical visit is only one step in your treatment. Even if you feel better, you still need to do what your doctor recommends, such as going to all suggested follow-up appointments and taking medicines exactly as directed. This will help you recover and help prevent future problems. How can you care for yourself at home? · Rest until you feel better. · Take your medicine exactly as prescribed. Call your doctor if you think you are having a problem with your medicine. · Do not drive after taking a prescription pain medicine. When should you call for help? Call 911 if:  ? · You passed out (lost consciousness). ? · You have severe difficulty breathing. ? · You have symptoms of a heart attack. These may include:  ¨ Chest pain or pressure, or a strange feeling in your chest.  ¨ Sweating. ¨ Shortness of breath. ¨ Nausea or vomiting. ¨ Pain, pressure, or a strange feeling in your back, neck, jaw, or upper belly or in one or both shoulders or arms. ¨ Lightheadedness or sudden weakness. ¨ A fast or irregular heartbeat.   After you call 911, the  may tell you to chew 1 adult-strength or 2 to 4 low-dose aspirin. Wait for an ambulance. Do not try to drive yourself. ?Call your doctor today if:  ? · You have any trouble breathing. ? · Your chest pain gets worse. ? · You are dizzy or lightheaded, or you feel like you may faint. ? · You are not getting better as expected. ? · You are having new or different chest pain. Where can you learn more? Go to http://jesus-janet.info/. Enter A120 in the search box to learn more about \"Chest Pain: Care Instructions. \"  Current as of: March 20, 2017  Content Version: 11.4  © 8239-3640 [employer]. Care instructions adapted under license by [employer] (which disclaims liability or warranty for this information). If you have questions about a medical condition or this instruction, always ask your healthcare professional. [place] [address] any warranty or liability for your use of this information. Urinary Tract Infection in Women: Care Instructions  Your Care Instructions    A urinary tract infection, or UTI, is a general term for an infection anywhere between the kidneys and the urethra (where urine comes out). Most UTIs are bladder infections. They often cause pain or burning when you urinate. UTIs are caused by bacteria and can be cured with antibiotics. Be sure to complete your treatment so that the infection goes away. Follow-up care is a key part of your treatment and safety. Be sure to make and go to all appointments, and call your doctor if you are having problems. It's also a good idea to know your test results and keep a list of the medicines you take. How can you care for yourself at home? · Take your antibiotics as directed. Do not stop taking them just because you feel better. You need to take the full course of antibiotics. · Drink extra water and other fluids for the next day or two. This may help wash out the bacteria that are causing the infection.  (If you have kidney, heart, or liver disease and have to limit fluids, talk with your doctor before you increase your fluid intake.)  · Avoid drinks that are carbonated or have caffeine. They can irritate the bladder. · Urinate often. Try to empty your bladder each time. · To relieve pain, take a hot bath or lay a heating pad set on low over your lower belly or genital area. Never go to sleep with a heating pad in place. To prevent UTIs  · Drink plenty of water each day. This helps you urinate often, which clears bacteria from your system. (If you have kidney, heart, or liver disease and have to limit fluids, talk with your doctor before you increase your fluid intake.)  · Urinate when you need to. · Urinate right after you have sex. · Change sanitary pads often. · Avoid douches, bubble baths, feminine hygiene sprays, and other feminine hygiene products that have deodorants. · After going to the bathroom, wipe from front to back. When should you call for help? Call your doctor now or seek immediate medical care if:  ? · Symptoms such as fever, chills, nausea, or vomiting get worse or appear for the first time. ? · You have new pain in your back just below your rib cage. This is called flank pain. ? · There is new blood or pus in your urine. ? · You have any problems with your antibiotic medicine. ? Watch closely for changes in your health, and be sure to contact your doctor if:  ? · You are not getting better after taking an antibiotic for 2 days. ? · Your symptoms go away but then come back. Where can you learn more? Go to http://jesus-janet.info/. Enter W846 in the search box to learn more about \"Urinary Tract Infection in Women: Care Instructions. \"  Current as of: May 12, 2017  Content Version: 11.4  © 9452-5845 [employer]. Care instructions adapted under license by [employer] (which disclaims liability or warranty for this information).  If you have questions about a medical condition or this instruction, always ask your healthcare professional. [place] [address] any warranty or liability for your use of this information.

## 2017-12-20 NOTE — ED TRIAGE NOTES
Patient here with c/o body aches and pains, predominately on left side including left arm, leg, and left chest stating \"right up in my breast\". Patient also c/o nausea and vomiting, reports right side lower pelvic and right lower back pains. Patient denies fevers. Patient reports one loose stool \"the other day\". Patient denies changes to diet. Denies being around anyone sick.

## 2017-12-20 NOTE — ED NOTES
Pt c/o N/V this am, denies diarrhea. Pt also c/o chest pain and left arm and leg pain. Denies injury or heavy lifting. Emergency Department Nursing Plan of Care       The Nursing Plan of Care is developed from the Nursing assessment and Emergency Department Attending provider initial evaluation. The plan of care may be reviewed in the ED Provider note.     The Plan of Care was developed with the following considerations:   Patient / Family readiness to learn indicated by:verbalized understanding  Persons(s) to be included in education: patient  Barriers to Learning/Limitations:No    Signed     Brendan Tilley RN    12/20/2017   2:42 PM

## 2017-12-20 NOTE — ED NOTES
Pt sts she feels better compared to arrival. Pt accepted DC data and med's. Pt left unit steady gait. Patient (s)  given copy of dc instructions and 3 script(s). Patient (s)  verbalized understanding of instructions and script (s). Patient given a current medication reconciliation form and verbalized understanding of their medications. Patient (s) verbalized understanding of the importance of discussing medications with  his or her physician or clinic they will be following up with. Patient alert and oriented and in no acute distress. Patient discharged home ambulatory with self.
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
## 2017-12-22 LAB
BACTERIA SPEC CULT: NORMAL
CC UR VC: NORMAL
SERVICE CMNT-IMP: NORMAL

## 2017-12-26 LAB
ATRIAL RATE: 86 BPM
CALCULATED P AXIS, ECG09: 52 DEGREES
CALCULATED R AXIS, ECG10: 38 DEGREES
CALCULATED T AXIS, ECG11: 45 DEGREES
DIAGNOSIS, 93000: NORMAL
P-R INTERVAL, ECG05: 140 MS
Q-T INTERVAL, ECG07: 354 MS
QRS DURATION, ECG06: 70 MS
QTC CALCULATION (BEZET), ECG08: 423 MS
VENTRICULAR RATE, ECG03: 86 BPM

## 2018-05-01 ENCOUNTER — HOSPITAL ENCOUNTER (INPATIENT)
Age: 38
LOS: 6 days | Discharge: HOME | DRG: 753 | End: 2018-05-07
Attending: EMERGENCY MEDICINE
Payer: MEDICAID

## 2018-05-01 DIAGNOSIS — F23 ACUTE PSYCHOSIS (HCC): Primary | ICD-10-CM

## 2018-05-01 PROBLEM — F31.9 AFFECTIVE PSYCHOSIS, BIPOLAR (HCC): Status: ACTIVE | Noted: 2018-05-01

## 2018-05-01 LAB
AMPHET UR QL SCN: NEGATIVE
ANION GAP SERPL CALC-SCNC: 15 MMOL/L (ref 5–15)
APPEARANCE UR: ABNORMAL
BACTERIA URNS QL MICRO: ABNORMAL /HPF
BARBITURATES UR QL SCN: NEGATIVE
BASOPHILS # BLD: 0 K/UL (ref 0–0.1)
BASOPHILS NFR BLD: 0 % (ref 0–1)
BENZODIAZ UR QL: NEGATIVE
BILIRUB UR QL: NEGATIVE
BUN SERPL-MCNC: 10 MG/DL (ref 6–20)
BUN/CREAT SERPL: 11 (ref 12–20)
CALCIUM SERPL-MCNC: 9.7 MG/DL (ref 8.5–10.1)
CANNABINOIDS UR QL SCN: POSITIVE
CHLORIDE SERPL-SCNC: 103 MMOL/L (ref 97–108)
CO2 SERPL-SCNC: 22 MMOL/L (ref 21–32)
COCAINE UR QL SCN: NEGATIVE
COLOR UR: ABNORMAL
CREAT SERPL-MCNC: 0.87 MG/DL (ref 0.55–1.02)
DIFFERENTIAL METHOD BLD: ABNORMAL
DRUG SCRN COMMENT,DRGCM: ABNORMAL
EOSINOPHIL # BLD: 0 K/UL (ref 0–0.4)
EOSINOPHIL NFR BLD: 0 % (ref 0–7)
EPITH CASTS URNS QL MICRO: ABNORMAL /LPF
ERYTHROCYTE [DISTWIDTH] IN BLOOD BY AUTOMATED COUNT: 12.4 % (ref 11.5–14.5)
ETHANOL SERPL-MCNC: <10 MG/DL
GLUCOSE SERPL-MCNC: 111 MG/DL (ref 65–100)
GLUCOSE UR STRIP.AUTO-MCNC: NEGATIVE MG/DL
HCG UR QL: NEGATIVE
HCT VFR BLD AUTO: 38.7 % (ref 35–47)
HGB BLD-MCNC: 13.2 G/DL (ref 11.5–16)
HGB UR QL STRIP: NEGATIVE
IMM GRANULOCYTES # BLD: 0.1 K/UL (ref 0–0.04)
IMM GRANULOCYTES NFR BLD AUTO: 1 % (ref 0–0.5)
KETONES UR QL STRIP.AUTO: 40 MG/DL
LEUKOCYTE ESTERASE UR QL STRIP.AUTO: ABNORMAL
LYMPHOCYTES # BLD: 2 K/UL (ref 0.8–3.5)
LYMPHOCYTES NFR BLD: 19 % (ref 12–49)
MCH RBC QN AUTO: 31.1 PG (ref 26–34)
MCHC RBC AUTO-ENTMCNC: 34.1 G/DL (ref 30–36.5)
MCV RBC AUTO: 91.3 FL (ref 80–99)
METHADONE UR QL: NEGATIVE
MONOCYTES # BLD: 0.6 K/UL (ref 0–1)
MONOCYTES NFR BLD: 6 % (ref 5–13)
NEUTS SEG # BLD: 7.6 K/UL (ref 1.8–8)
NEUTS SEG NFR BLD: 74 % (ref 32–75)
NITRITE UR QL STRIP.AUTO: NEGATIVE
NRBC # BLD: 0 K/UL (ref 0–0.01)
NRBC BLD-RTO: 0 PER 100 WBC
OPIATES UR QL: NEGATIVE
PCP UR QL: NEGATIVE
PH UR STRIP: 6 [PH] (ref 5–8)
PLATELET # BLD AUTO: 240 K/UL (ref 150–400)
PMV BLD AUTO: 10.5 FL (ref 8.9–12.9)
POTASSIUM SERPL-SCNC: 3.7 MMOL/L (ref 3.5–5.1)
PROT UR STRIP-MCNC: NEGATIVE MG/DL
RBC # BLD AUTO: 4.24 M/UL (ref 3.8–5.2)
RBC #/AREA URNS HPF: ABNORMAL /HPF (ref 0–5)
SODIUM SERPL-SCNC: 140 MMOL/L (ref 136–145)
SP GR UR REFRACTOMETRY: 1.01 (ref 1–1.03)
UA: UC IF INDICATED,UAUC: ABNORMAL
UROBILINOGEN UR QL STRIP.AUTO: 0.2 EU/DL (ref 0.2–1)
WBC # BLD AUTO: 10.2 K/UL (ref 3.6–11)
WBC URNS QL MICRO: ABNORMAL /HPF (ref 0–4)

## 2018-05-01 PROCEDURE — 81001 URINALYSIS AUTO W/SCOPE: CPT | Performed by: EMERGENCY MEDICINE

## 2018-05-01 PROCEDURE — 87086 URINE CULTURE/COLONY COUNT: CPT | Performed by: EMERGENCY MEDICINE

## 2018-05-01 PROCEDURE — 80048 BASIC METABOLIC PNL TOTAL CA: CPT | Performed by: EMERGENCY MEDICINE

## 2018-05-01 PROCEDURE — 81025 URINE PREGNANCY TEST: CPT

## 2018-05-01 PROCEDURE — 85025 COMPLETE CBC W/AUTO DIFF WBC: CPT | Performed by: EMERGENCY MEDICINE

## 2018-05-01 PROCEDURE — 90791 PSYCH DIAGNOSTIC EVALUATION: CPT

## 2018-05-01 PROCEDURE — 36415 COLL VENOUS BLD VENIPUNCTURE: CPT | Performed by: EMERGENCY MEDICINE

## 2018-05-01 PROCEDURE — 80307 DRUG TEST PRSMV CHEM ANLYZR: CPT | Performed by: EMERGENCY MEDICINE

## 2018-05-01 PROCEDURE — 96372 THER/PROPH/DIAG INJ SC/IM: CPT

## 2018-05-01 PROCEDURE — 74011250636 HC RX REV CODE- 250/636: Performed by: EMERGENCY MEDICINE

## 2018-05-01 PROCEDURE — 99285 EMERGENCY DEPT VISIT HI MDM: CPT

## 2018-05-01 PROCEDURE — 65220000003 HC RM SEMIPRIVATE PSYCH

## 2018-05-01 RX ORDER — LORAZEPAM 2 MG/ML
2 INJECTION INTRAMUSCULAR
Status: COMPLETED | OUTPATIENT
Start: 2018-05-01 | End: 2018-05-01

## 2018-05-01 RX ADMIN — LORAZEPAM 2 MG: 2 INJECTION INTRAMUSCULAR; INTRAVENOUS at 16:01

## 2018-05-01 NOTE — ED NOTES
Bedside shift change report given to Rod May RN (oncoming nurse) by EDILSON Edwards RN (offgoing nurse). Report included the following information SBAR, ED Summary, MAR and Recent Results. Assumed pt care at this time. Pt noted to be resting comfortably, eating her meal tray. Pt given 3 additional apple juices per her request. Pt updated on plan of care, has no questions or concerns at this time.

## 2018-05-01 NOTE — IP AVS SNAPSHOT
[address] [place] [address] 
 
 
 [address] Patient: Katya Ortiz MRN: [id number] TBI:[id number] A check diego indicates which time of day the medication should be taken. My Medications Notice You have not been prescribed any medications.

## 2018-05-01 NOTE — ED NOTES
Per dr Jose Enrique Franco md, the  stated that [employer] could place pt under a paperless ECO. [employer] Officer Wiliam Pritchett is aware of ECO.
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
## 2018-05-01 NOTE — ED NOTES
Patient with third trip to the rest room, again stating unable to get urine into the cup. States \"I forgot! I went in the toilet instead! \"
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
## 2018-05-01 NOTE — ED NOTES
When telepsych spoke with pt, pt yelled at them stating \"she can't even come here. \" dr francisco md, was notified. Medical ECO initiated by dr Jayden Bruner md, and crisis counselor called. In-house [employer] officer was notified that pt is under medical ECO pending crisis evaluation.
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
## 2018-05-01 NOTE — ED NOTES
[employer] reports that the phone number that patient provided for her sister not correct. Patient consenting to let [facility] call patient's mother.

## 2018-05-01 NOTE — ED NOTES
Pt refused to confirm name and date of birth. Pt reported \"look in the [employer]. \" patient information taken from EMS.

## 2018-05-01 NOTE — ED NOTES

## 2018-05-01 NOTE — ED NOTES
Pt still yelling intermittently. Pt did state \"my  got locked up, I don't want to pay rent anymore, I just want to get locked up for a week so that I can sleep. \" pt very rude, verbally aggressive, towards staff. Pt within line of sight of staff and call bell within reach.

## 2018-05-01 NOTE — ED NOTES
Patient visited by her parents. Patient states that she does not want visitors, parents at bedside only very briefly. Patient eating food at this time. Parents given updates. Contact numbers for parents listed below.       Milan Monse (father cell [phone]  Bao Montano (mother house number) [phone]

## 2018-05-01 NOTE — IP AVS SNAPSHOT
[facility] 
 
 
 [address] Jossy Patient: Bret Johnson MRN: [id number] MTB:[id number]/80/[id number] About your hospitalization You were admitted on:  May 1, 2018 You last received care in the:  [facility] You were discharged on: May 7, 2018 Why you were hospitalized Your primary diagnosis was:  Bipolar Disorder (Hcc) Your diagnoses also included:  Agitation, Marijuana Abuse, Antisocial Personality Disorder Follow-up Information Follow up With Details Comments Contact [address]   Same day access appointment 5/8/18 @ 7:30am-3:00pm for intake assessment  [address] [place], [address] [place] [address] 
[phone] Discharge Orders None A check diego indicates which time of day the medication should be taken. My Medications Notice You have not been prescribed any medications. Discharge Instructions DISCHARGE SUMMARY from Nurse PATIENT INSTRUCTIONS: 
 
What to do at Home: 
 
Recommended activity: Activity as tolerated, *  Please give a list of your current medications to your Primary Care Provider. *  Please update this list whenever your medications are discontinued, doses are 
    changed, or new medications (including over-the-counter products) are added. *  Please carry medication information at all times in case of emergency situations. These are general instructions for a healthy lifestyle: No smoking/ No tobacco products/ Avoid exposure to second hand smoke Surgeon General's Warning:  Quitting smoking now greatly reduces serious risk to your health. Obesity, smoking, and sedentary lifestyle greatly increases your risk for illness A healthy diet, regular physical exercise & weight monitoring are important for maintaining a healthy lifestyle You may be retaining fluid if you have a history of heart failure or if you experience any of the following symptoms:  Weight gain of 3 pounds or more overnight or 5 pounds in a week, increased swelling in our hands or feet or shortness of breath while lying flat in bed. Please call your doctor as soon as you notice any of these symptoms; do not wait until your next office visit. Recognize signs and symptoms of STROKE: 
 
F-face looks uneven A-arms unable to move or move unevenly S-speech slurred or non-existent T-time-call 911 as soon as signs and symptoms begin-DO NOT go Back to bed or wait to see if you get better-TIME IS BRAIN. Warning Signs of HEART ATTACK Call 911 if you have these symptoms: 
? Chest discomfort. Most heart attacks involve discomfort in the center of the chest that lasts more than a few minutes, or that goes away and comes back. It can feel like uncomfortable pressure, squeezing, fullness, or pain. ? Discomfort in other areas of the upper body. Symptoms can include pain or discomfort in one or both arms, the back, neck, jaw, or stomach. ? Shortness of breath with or without chest discomfort. ? Other signs may include breaking out in a cold sweat, nausea, or lightheadedness. Don't wait more than five minutes to call [address]! Fast action can save your life. Calling 911 is almost always the fastest way to get lifesaving treatment. Emergency Medical Services staff can begin treatment when they arrive  up to an hour sooner than if someone gets to the hospital by car. The discharge information has been reviewed with the patient. The patient verbalized understanding. Discharge medications reviewed with the patient and appropriate educational materials and side effects teaching were provided.  
 
If I feel that I can not keep these promises and I am at risk of hurting myself or others,I will call the crisis office and speak with a crisis worker who will assist me during my crisis. [address] [facility] [place] [phone] [address] [phone] Crisis [phone] Olivia Sanches [phone] 
___________________________________________________________________________________________________________________________________ Introducing [facility]! Barbara Hahn introduces Sympoz (db[employer] Craft[employer]) patient portal. Now you can access parts of your medical record, email your doctor's office, and request medication refills online. 1. In your internet browser, go to [email or web address] 2. Click on the First Time User? Click Here link in the Sign In box. You will see the New Member Sign Up page. 3. Enter your Sympoz ([employer]) Access Code exactly as it appears below. You will not need to use this code after youve completed the sign-up process. If you do not sign up before the expiration date, you must request a new code. · Sympoz ([employer]) Access Code: [email or web address] Expires: 7/30/2018  3:49 PM 
 
4. Enter the last four digits of your Social Security Number (xxxx) and Date of Birth (mm/dd/yyyy) as indicated and click Submit. You will be taken to the next sign-up page. 5. Create a IOCSt ID. This will be your Sympoz ([employer] Craftsy) login ID and cannot be changed, so think of one that is secure and easy to remember. 6. Create a Sympoz ([employer]) password. You can change your password at any time. 7. Enter your Password Reset Question and Answer. This can be used at a later time if you forget your password. 8. Enter your e-mail address. You will receive e-mail notification when new information is available in [address] 9. Click Sign Up. You can now view and download portions of your medical record. 10. Click the Download Summary menu link to download a portable copy of your medical information.  
 
If you have questions, please visit the Frequently Asked Questions section of the [employer]. Remember, MyChart is NOT to be used for urgent needs. For medical emergencies, dial 911. Now available from your iPhone and Android! Introducing Alex Boyd As a Arvandrea Mendoza patient, I wanted to make you aware of our electronic visit tool called Alex Boyd. [employer]All Together Now allows you to connect within minutes with a medical provider 24 hours a day, seven days a week via a mobile device or tablet or logging into a secure website from your computer. You can access Alex Boyd from anywhere in the [place]. A virtual visit might be right for you when you have a simple condition and feel like you just dont want to get out of bed, or cant get away from work for an appointment, when your regular [facility]  provider is not available (evenings, weekends or holidays), or when youre out of town and need minor care. Electronic visits cost only $49 and if the ArvPictela 24/7 provider determines a prescription is needed to treat your condition, one can be electronically transmitted to a nearby pharmacy*. Please take a moment to enroll today if you have not already done so. The enrollment process is free and takes just a few minutes. To enroll, please download the [employer]All Together Now kuldip to your tablet or phone, or visit [email or web address] to enroll on your computer. And, as an [address] [place] [address] patient with a HistoPathway account, the results of your visits will be scanned into your electronic medical record and your primary care provider will be able to view the scanned results. We urge you to continue to see your regular [facility]  provider for your ongoing medical care. And while your primary care provider may not be the one available when you seek a Alex Boyd virtual visit, the peace of mind you get from getting a real diagnosis real time can be priceless. For more information on Alex Boyd, view our Frequently Asked Questions (FAQs) at [email or web address]. Sincerely, 
 
Jayashree Sanabria MD 
Chief Medical Officer Kathleen Graham *:  certain medications cannot be prescribed via Alex Boyd Providers Seen During Your Hospitalization Provider Specialty Primary office phone Tj Edwards MD Emergency Medicine [phone] Wilma Leung MD Psychiatry [phone] Your Primary Care Physician (PCP) Primary Care Physician Office Phone Office Fax Chetna Cummingsrenay [phone] You are allergic to the following No active allergies Recent Documentation Height Weight Breastfeeding? BMI OB Status Smoking Status 1.575 m 103.9 kg No 41.88 kg/m2 Hysterectomy Former Smoker Emergency Contacts Name Discharge Info Relation Home Work Mobile [facility] HOSPITAL DISCHARGE CAREGIVER [3] Mother [14] [phone] Patient Belongings The following personal items are in your possession at time of discharge: 
  Dental Appliances: None  Visual Aid: None      Home Medications: Sent to pharmacy   Jewelry:  (necklace sent to safe)  Clothing: Shirt    Other Valuables: Cell Phone (gold/amna colored necklace/chain to security)  Personal Items Sent to Safe: See valuables form Please provide this summary of care documentation to your next provider. Signatures-by signing, you are acknowledging that this After Visit Summary has been reviewed with you and you have received a copy. Patient Signature:  ____________________________________________________________ Date:  ____________________________________________________________  
  
Leonila Boykin Provider Signature:  ____________________________________________________________ Date:  ____________________________________________________________

## 2018-05-02 PROBLEM — F29 PSYCHOSIS (HCC): Status: ACTIVE | Noted: 2018-05-02

## 2018-05-02 PROCEDURE — 74011250637 HC RX REV CODE- 250/637

## 2018-05-02 PROCEDURE — 74011250636 HC RX REV CODE- 250/636

## 2018-05-02 PROCEDURE — 65220000003 HC RM SEMIPRIVATE PSYCH

## 2018-05-02 PROCEDURE — 74011000250 HC RX REV CODE- 250

## 2018-05-02 RX ORDER — ADHESIVE BANDAGE
30 BANDAGE TOPICAL DAILY PRN
Status: DISCONTINUED | OUTPATIENT
Start: 2018-05-02 | End: 2018-05-07 | Stop reason: HOSPADM

## 2018-05-02 RX ORDER — ACETAMINOPHEN 325 MG/1
650 TABLET ORAL
Status: DISCONTINUED | OUTPATIENT
Start: 2018-05-02 | End: 2018-05-07 | Stop reason: HOSPADM

## 2018-05-02 RX ORDER — IBUPROFEN 200 MG
1 TABLET ORAL
Status: DISCONTINUED | OUTPATIENT
Start: 2018-05-02 | End: 2018-05-07 | Stop reason: HOSPADM

## 2018-05-02 RX ORDER — LORAZEPAM 1 MG/1
1 TABLET ORAL
Status: DISCONTINUED | OUTPATIENT
Start: 2018-05-02 | End: 2018-05-04

## 2018-05-02 RX ORDER — LORAZEPAM 2 MG/ML
2 INJECTION INTRAMUSCULAR
Status: DISCONTINUED | OUTPATIENT
Start: 2018-05-02 | End: 2018-05-07 | Stop reason: HOSPADM

## 2018-05-02 RX ORDER — BENZTROPINE MESYLATE 1 MG/ML
2 INJECTION INTRAMUSCULAR; INTRAVENOUS
Status: DISCONTINUED | OUTPATIENT
Start: 2018-05-02 | End: 2018-05-07 | Stop reason: HOSPADM

## 2018-05-02 RX ORDER — ZOLPIDEM TARTRATE 10 MG/1
10 TABLET ORAL
Status: DISCONTINUED | OUTPATIENT
Start: 2018-05-02 | End: 2018-05-07 | Stop reason: HOSPADM

## 2018-05-02 RX ORDER — IBUPROFEN 400 MG/1
400 TABLET ORAL
Status: DISCONTINUED | OUTPATIENT
Start: 2018-05-02 | End: 2018-05-07 | Stop reason: HOSPADM

## 2018-05-02 RX ORDER — OLANZAPINE 5 MG/1
5 TABLET ORAL
Status: DISCONTINUED | OUTPATIENT
Start: 2018-05-02 | End: 2018-05-07 | Stop reason: HOSPADM

## 2018-05-02 RX ORDER — BENZTROPINE MESYLATE 2 MG/1
2 TABLET ORAL
Status: DISCONTINUED | OUTPATIENT
Start: 2018-05-02 | End: 2018-05-07 | Stop reason: HOSPADM

## 2018-05-02 RX ADMIN — LORAZEPAM 1 MG: 1 TABLET ORAL at 23:39

## 2018-05-02 RX ADMIN — ACETAMINOPHEN 650 MG: 325 TABLET, FILM COATED ORAL at 00:21

## 2018-05-02 RX ADMIN — WATER 20 MG: 1 INJECTION INTRAMUSCULAR; INTRAVENOUS; SUBCUTANEOUS at 06:26

## 2018-05-02 RX ADMIN — ZOLPIDEM TARTRATE 10 MG: 10 TABLET ORAL at 20:39

## 2018-05-02 RX ADMIN — LORAZEPAM 2 MG: 2 INJECTION INTRAMUSCULAR; INTRAVENOUS at 00:25

## 2018-05-02 RX ADMIN — IBUPROFEN 400 MG: 400 TABLET ORAL at 23:39

## 2018-05-02 NOTE — PROGRESS NOTES
SPEECH THERAPY SCREENING:  SERVICES ARE NOT INDICATED AT THIS TIME    [facility] therapy based on results obtained during the nursing admission assessment. The patients chart was reviewed and the patient is not appropriate for a skilled therapy evaluation at this time. Please consult speech therapy if any therapy needs arise. Thank you.     Medhat Enrique, SLP

## 2018-05-02 NOTE — BH NOTES
RESTRAINT DEBRIEFING FORM FOR BEHAVIOR MANAGEMENT STANDARD       Betty Horn                                                                                               1. Did the patient request family involvement in the debriefing meeting: NO    2. Is patient/family involved in the debriefing: NO    3. Did patient request family be notified of application of restraint: NO  If YES, who was notified? Their perception of the event:     4. Date restraint applied: 05/02/18 Time restraint applied: 7725    3. Type of restraint applied: Seclusion    6. Who applied restraints (names): danger to self and others    7. Why was restraint applied: danger to self and others    8. What led to the application of restraint: danger to self and others    9. What measures were tried prior to application of restraint: education and rediection    10. During the time the patient was restrained, were the following addressed: Physical Well Being, YES, Psychological Comfort, YES, Right to Privacy, YES    11. Did the patient sustain any trauma from this incident: NO  If YES, explain:   Did staff sustain any trauma from this incident: NO  If YES, explain:     12. Was patient restrained/secluded for more than 12 hours? NO  If YES, was Clinical Medical Director notified? YES  If YES, name of  on call notified:     15. Did patient have (2) or more separate behavior episodes within a 12 hour period? NO  If YES, was Clinical Medical Director notified? N/A  If YES, name of  on call notified:     15.  What staff members participated in the debriefing? all    15. What issues were identified as a result of the debriefing? none    16.  Based on the incident, was the patient's Care Plan modified: YES  If YES, explain:     RN Signature_______________________________________Date_______Time______  Patient's Signature___________________________________Date_______Time______

## 2018-05-02 NOTE — H&P
History and Physical    Subjective:     Macho Singh is a 41 y.o. black female who appears to be obese and has HTN listed as her diagnosis be,low which she was not aware of.    Per admitting psychiatrist documentation, Pt admitted under a temporary [facility] order (TDO) for threatening harm to others and mood swing proving to be an imminent danger to self and others . Pt is a  BLACK OR  female with a past psychiatric history significant for bipolar vs adjustment d/o, who is admitted at this time with complaints of (and/or evidence of) the following emotional symptoms: homicidal thoughts/threats. Additional symptomatology include irritable and labile mood and agitation. Pt received prn medication and utilized seclusion due to aggressive behavior. Pt reported to have been threatening her stepdaughter and her mother. The above symptoms have been present for few days. These symptoms are of severe severity. These symptoms are intermittent/ fleeting in nature. The patient's condition has been precipitated by and psychosocial stressors ( ). Patient's condition made worse by continued illicit drug use and treatment noncompliance. UDS: +MJ ; BAL=0.     Pt denies any acute medical issues. She claims to have ceruman impaction diagnosed by ER or [facility]. She is showing no signs of acute distress. She appears to be very over weight with her body habitus.     Past Medical History:   Diagnosis Date    Anxiety     Hypertension     Other ill-defined conditions(799.89)     Hydrocephalus - mild    Psychiatric disorder     Anxiety      Past Surgical History:   Procedure Laterality Date    HX  SECTION  00,03    x2    HX HEENT      LP to relieve right eye pressure    HX HYSTERECTOMY      Partial    HX LAPAROSCOPIC SUPRACERVICAL HYSTERECTOMY  14    HX OTHER SURGICAL  2013    [employer]    HX TUBAL LIGATION       Family History   Problem Relation Age of Onset    Diabetes Maternal Grandmother     Stroke Maternal Grandmother       Social History   Substance Use Topics    Smoking status: Former Smoker     Quit date: 3/27/2012    Smokeless tobacco: Never Used      Comment: two times weekly    Alcohol use No      Comment: 4/month       Prior to Admission medications    Not on File     No Known Allergies     Review of Systems:  Constitutional: negative  Eyes: negative  Ears, Nose, Mouth, Throat, and Face: negative  Respiratory: negative  Cardiovascular: negative  Gastrointestinal: negative  Genitourinary:negative  Integument/Breast: negative  Hematologic/Lymphatic: negative  Musculoskeletal:negative  Neurological: negative  Behavioral/Psychiatric: agitation and homicidal thoughts. Endocrine: negative  Allergic/Immunologic: negative     Objective: Intake and Output:            Physical Exam:   Visit Vitals    /83 (BP 1 Location: Right arm, BP Patient Position: At rest;Sitting)    Pulse (!) 106    Temp 98.3 °F (36.8 °C)    Resp 18    Ht 5' 2\" (1.575 m)    Wt 103.9 kg (229 lb)    LMP 04/15/2014    SpO2 100%    Breastfeeding No    BMI 41.88 kg/m2     General:  Alert, cooperative, no distress, appears stated age. Head:  Normocephalic, without obvious abnormality, atraumatic. Eyes:  Conjunctivae/corneas clear. PERRL, EOMs intact. Ears:  Normal external ear canals both ears. Nose: Nares normal. Septum midline. Mucosa normal. No drainage or sinus tenderness. Throat: Lips, mucosa, and tongue normal. Teeth and gums normal.   Neck: Supple, symmetrical, trachea midline, no adenopathy, no goiter. Back:   Symmetric, no curvature. ROM normal. No CVA tenderness. Lungs:   Clear to auscultation bilaterally. Chest wall:  No tenderness or deformity. Heart:  Regular rate and rhythm, S1, S2 normal, no murmur, click, rub or gallop. Abdomen:   Soft, non-tender. Bowel sounds normal. No masses,  No organomegaly.    Extremities: Extremities normal, atraumatic, no cyanosis or edema.   Pulses: Pulses present distally   Skin: Skin color, texture, turgor normal. No rashes or lesions   Lymph nodes: No cervical lymphadenopathy   Neurologic: Reflexes present in the patellar region. Data Review:   No results found for this or any previous visit (from the past 24 hour(s)). Assessment:     Principal Problem:    Psychosis (5/2/2018)    HTN    Ceruman impaction per patient      Plan:     Monitopr BP. Can start antihypertensive if needed. Can have ear flushed as outpatient. No VTE prophylaxis indicated or necessary at this time.    Signed By: Sunshine Luis MD     May 2, 2018

## 2018-05-02 NOTE — BH NOTES
BEHAVIORAL HEALTH RESTRAINT/SECLUSION INITIAL ASSESSMENT      1. Assessment of High Risk factors: Preexisting medical conditions that places patient at greater risk when placed in restraints are as follows: None    2. Preexisting history of psychological conditions that place patient at greater risk when placed in restraints: None    3. Current behavior/mental status: Agitated, Delusional, Severe anxiety, Threatening physical abuse and Verbally abusive    4. Initial use of restraint for this patient is justified by: Imminent risk of injury to others    5. Least restrictive tools/methods (Check all that apply): Attentive listening, Pain relief, Problem solving, PRN medications and Verbal de-escalation    6. Criteria for release: No longer verbalizing threats of harm to self or others, Substantial reduction in level of agitation/anxiety as indicated in reduction in motor over activity, ability to focus, maintain attention and decrease in hostility and willing to agree to least restrictive alternatives     7. Treatment plan revisions to assist the patient in regaining control: Anger assessment, Continue problem solving discussions with staff and Medication evaluation    8. Patient consented to family involvement in restraint/seclusion process: No    9. Personal safety search completed: Yes         MD/RN Signature:_ Paola Ledesma Rn________________________________  Date:_5/2/18____________    2170 PT approached the desk demanding various medications, codeine , ativan, pain medication. She began yelling and cursing threatening staff. She was asked if she needed time out however she continued to threaten and postured to fight staff.  Security was called and she was escorted to locked seclusion    0640 Pt received geodon 20 mg Im for agitated behavior

## 2018-05-02 NOTE — BH NOTES
0745 Patient was received in locked seclusion this morning. Door was opened because patient is sleeping at this time. Staff attempted to awake patient but patient continues to sleep due to medications. Will continue to monitor patient and assess needs. Patient will be removed from time out once awake. Patient remains on a 1:1.     0800 patient is awake. Apologetic about situation that happened. Contracts for safety. Patient returned to room. Will continue to monitor patient and assess needs.

## 2018-05-02 NOTE — BH NOTES
BEHAVIORAL HEALTH RESTRAINT/SECLUSION PROGRESS NOTE TERMINATION OF RESTRAINT/SECLUSION    1. Current mental status/behavior: Others (comment)patient is sleeping at this time    2. Criteria for release met: No longer verbalizing threats of harm to self and others, Acute signs and symptoms necessitating restraint/seclusion have decreased substantially to the level where patient can safely function in least restrictive environment., Substantial reduction in level of agitation/anxiety as indicated by reduction in motor over activity, ability to focus, maintian attention and decrease in hostility and Agreed to contact for safety    3.  Additional interventions to prevent recurrence of dangerous behaviors include the following in addition to the debriefing process by the team.   All        RN Signature__________________________________ Date_______________      MD Signature__________________________________ Date_______________

## 2018-05-02 NOTE — ED NOTES
[facility] Spoke to Aure Melchor who is requesting we fax pt labs to her. Labs faxed. Pt noted to be calm and resting comfortably with lights dimmed at this time. [employer] officer remains at bedside.

## 2018-05-02 NOTE — BH NOTES
Behavioral Restraint Face-to-Face Evaluation  (must be completed within one hour of initiation of restraints)      Evaluate immediate situation:  Pt in hallway cursing , threatening staff posturing to fight. Attempt to talk to pt however she continued to yell and threaten    Reaction to intervention: pt escorted into locked seclusion and continued to yell curse and bang    Medical Condition/Assessment:Pt stable      Behavioral Condition/Assessment: JAgitated yelling threatening      The patients review of systems, history, medications, and recent labs were reviewed at this time.      Continue/Discontinue restraints at this time: Continue

## 2018-05-02 NOTE — BH NOTES
PSYCHOSOCIAL ASSESSMENT  :Patient identifying info:  Acacia Grace is a 41 y.o., female admitted 2018  3:43 PM     Presenting problem and precipitating factors: Pt was admitted on a TDO due to threatening harm to her boyfriend's daughter and mother. Pt flagged down the police to them what she wanted to do, you a text messages in her phone.      Mental status assessment: Pt was asleep in her room , unable to be aroused    Current psychiatric providers and contact info: Pt doesn't have a current provider     Previous psychiatric services/providers and response to treatment: Pt has had admissions at [facility]     Family history of mental illness : unknown     Substance abuse history:    Social History   Substance Use Topics    Smoking status: Former Smoker     Quit date: 3/27/2012    Smokeless tobacco: Never Used      Comment: two times weekly    Alcohol use No      Comment: 4/month     Pt was positive for THC   Family constellation: Unknown     Is significant other involved? 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

## 2018-05-02 NOTE — BH NOTES
GROUP THERAPY PROGRESS NOTE    The patient Richard Nieves is participating in [employer]. Group time: 30 minutes    Personal goal for participation: to orient the patient to the unit.     Goal orientation: successful adoption of unit rules    Group therapy participation: minimal    Therapeutic interventions reviewed and discussed: Yes    Impression of participation: minimal    Jami Munson  5/2/2018 9:27 AM

## 2018-05-02 NOTE — ROUTINE PROCESS
TRANSFER - OUT REPORT:    Verbal report given to REJI Cooley RN on Betzaida Ascencio  being transferred to [facility] for routine progression of care       Report consisted of patients Situation, Background, Assessment and   Recommendations(SBAR). Information from the following report(s) SBAR, ED Summary, [facility] and Recent Results was reviewed with the receiving nurse. Lines:       Opportunity for questions and clarification was provided.       Patient transported with:  [employer] and

## 2018-05-02 NOTE — BH NOTES
Pt is a 47yo female here on temporary prison order due to cursing yelling threatening, verbally aggressive. She has previous diagnosis bipolar with psychosis. UDS+THC BAL <10 Pt reported on admission that she smoke THC frequently 4-5 blunts daily. She stated she was not suicidal but was homicidal toward her step daughter and her mother. She presents hyperverbal , manic, loud. She contracted for safety on the unit and stated she did not want to harm anyone here. She reported being depressed. She stated she was on probation and is required to contact  daily(porbation through [facility] [place] She lives in apartment with children(2) where she is endanger of eviction. She had previous admission in [facility]. Her skin assessment showed tattoo on the right arm and thigh([employer])  Report called to Dr. Isabel Shaw, He gave admission orders.  Pt placed on Q15 min checks for safety

## 2018-05-03 PROBLEM — F43.25 ADJUSTMENT DISORDER WITH MIXED DISTURBANCE OF EMOTIONS AND CONDUCT: Status: ACTIVE | Noted: 2018-05-03

## 2018-05-03 PROBLEM — F31.9 BIPOLAR DISORDER (HCC): Status: ACTIVE | Noted: 2018-05-03

## 2018-05-03 PROBLEM — R45.1 AGITATION: Status: ACTIVE | Noted: 2018-05-03

## 2018-05-03 PROBLEM — F60.2 ANTISOCIAL PERSONALITY DISORDER (HCC): Status: ACTIVE | Noted: 2018-05-03

## 2018-05-03 PROBLEM — F12.10 MARIJUANA ABUSE: Status: ACTIVE | Noted: 2018-05-03

## 2018-05-03 LAB
BACTERIA SPEC CULT: NORMAL
CC UR VC: NORMAL
SERVICE CMNT-IMP: NORMAL

## 2018-05-03 PROCEDURE — 74011250636 HC RX REV CODE- 250/636

## 2018-05-03 PROCEDURE — 65220000003 HC RM SEMIPRIVATE PSYCH

## 2018-05-03 PROCEDURE — 74011250637 HC RX REV CODE- 250/637: Performed by: PSYCHIATRY & NEUROLOGY

## 2018-05-03 PROCEDURE — 74011250637 HC RX REV CODE- 250/637

## 2018-05-03 PROCEDURE — 74011000250 HC RX REV CODE- 250

## 2018-05-03 RX ORDER — DIVALPROEX SODIUM 500 MG/1
500 TABLET, DELAYED RELEASE ORAL 2 TIMES DAILY
Status: DISCONTINUED | OUTPATIENT
Start: 2018-05-03 | End: 2018-05-04

## 2018-05-03 RX ADMIN — OLANZAPINE 5 MG: 5 TABLET, FILM COATED ORAL at 18:56

## 2018-05-03 RX ADMIN — WATER 20 MG: 1 INJECTION INTRAMUSCULAR; INTRAVENOUS; SUBCUTANEOUS at 00:36

## 2018-05-03 RX ADMIN — DIVALPROEX SODIUM 500 MG: 500 TABLET, DELAYED RELEASE ORAL at 10:46

## 2018-05-03 RX ADMIN — DIVALPROEX SODIUM 500 MG: 500 TABLET, DELAYED RELEASE ORAL at 20:49

## 2018-05-03 RX ADMIN — MAGNESIUM HYDROXIDE 30 ML: 400 SUSPENSION ORAL at 06:55

## 2018-05-03 RX ADMIN — LORAZEPAM 2 MG: 2 INJECTION INTRAMUSCULAR; INTRAVENOUS at 05:21

## 2018-05-03 RX ADMIN — LORAZEPAM 1 MG: 1 TABLET ORAL at 18:56

## 2018-05-03 NOTE — PROGRESS NOTES
Laboratory monitoring for mood stabilizer and antipsychotics:    Recommended baseline monitoring has not been completed based on this patient's current medication regimen. The following labs have been ordered to complete baseline monitoring: lipid panel, liver function tests (will order with steady-state valproic acid level)     The patient is currently taking the following medication(s):   Current Facility-Administered Medications   Medication Dose Route Frequency    divalproex DR (DEPAKOTE) tablet 500 mg  500 mg Oral BID       Height, Weight, BMI Estimation  Estimated body mass index is 41.88 kg/(m^2) as calculated from the following:    Height as of this encounter: 157.5 cm (62\"). Weight as of this encounter: 103.9 kg (229 lb). Renal Function, Hepatic Function and Chemistry  Estimated Creatinine Clearance: 100.1 mL/min (based on Cr of 0.87). Lab Results   Component Value Date/Time    Sodium 140 05/01/2018 03:53 PM    Potassium 3.7 05/01/2018 03:53 PM    Chloride 103 05/01/2018 03:53 PM    CO2 22 05/01/2018 03:53 PM    Anion gap 15 05/01/2018 03:53 PM    Glucose 111 (H) 05/01/2018 03:53 PM    BUN 10 05/01/2018 03:53 PM    Creatinine 0.87 05/01/2018 03:53 PM    BUN/Creatinine ratio 11 (L) 05/01/2018 03:53 PM    GFR est AA >60 05/01/2018 03:53 PM    GFR est non-AA >60 05/01/2018 03:53 PM    Calcium 9.7 05/01/2018 03:53 PM    ALT (SGPT) 22 12/20/2017 03:01 PM    AST (SGOT) 14 (L) 12/20/2017 03:01 PM    Alk.  phosphatase 58 12/20/2017 03:01 PM    Protein, total 7.8 12/20/2017 03:01 PM    Albumin 3.8 12/20/2017 03:01 PM    Globulin 4.0 12/20/2017 03:01 PM    A-G Ratio 1.0 (L) 12/20/2017 03:01 PM    Bilirubin, total 0.5 12/20/2017 03:01 PM       Lab Results   Component Value Date/Time    Glucose 111 (H) 05/01/2018 03:53 PM       Lab Results   Component Value Date/Time    Hemoglobin A1c 5.4 09/20/2013 03:45 PM       Hematology  Lab Results   Component Value Date/Time    WBC 10.2 05/01/2018 03:53 PM    HGB 13.2 05/01/2018 03:53 PM    HCT 38.7 05/01/2018 03:53 PM    PLATELET 908 35/60/3074 03:53 PM    MCV 91.3 05/01/2018 03:53 PM       Lipids  Lab Results   Component Value Date/Time    Cholesterol, total 147 01/29/2014 03:33 PM    HDL Cholesterol 78 01/29/2014 03:33 PM    LDL, calculated 58 01/29/2014 03:33 PM    Triglyceride 56 01/29/2014 03:33 PM    CHOL/HDL Ratio 2.0 09/21/2013 04:22 AM       Thyroid Function    Lab Results   Component Value Date/Time    TSH 0.599 01/29/2014 03:33 PM     Vitals  Visit Vitals    /77    Pulse 98    Temp 98.3 °F (36.8 °C)    Resp 22    Ht 157.5 cm (62\")    Wt 103.9 kg (229 lb)    SpO2 100%    Breastfeeding No    BMI 41.88 kg/m2       Pregnancy Test  Lab Results   Component Value Date/Time    HCG urine, QL NEGATIVE  09/24/2015 02:33 PM    Pregnancy test,urine (POC) NEGATIVE  05/01/2018 06:35 PM    HCG, Ql. NEGATIVE  03/27/2014 10:36 AM       Juan J Sanders, PharmD, BCPS  [phone] (pharmacy)

## 2018-05-03 NOTE — BH NOTES
Behavioral Restraint Face-to-Face Evaluation  (must be completed within one hour of initiation of restraints)      Evaluate immediate situation:  Pt standing at door cursing at staff       Reaction to intervention: Pt refuses teaching about criteria for release    Medical Condition/Assessment: Stable    Behavioral Condition/Assessment: Agitated, yelling cursing    The patients review of systems, history, medications, and recent labs were reviewed at this time.      Continue/Discontinue restraints at this time: Continue

## 2018-05-03 NOTE — PROGRESS NOTES
Problem: Manic Behavior (Adult/Pediatric)  Goal: *STG: Participates in treatment plan  Outcome: Progressing Towards Goal  Pt is alert and oriented on the unit. Pt mood is labile. She is irritable, loud, demanding but redirectable. Pt is concerned about violating probation and seeing an ENT doctor. Pt was provided therapeutic communication and reassurance. Pt H& P completed by Cristy Barlow. Staff will continue to monitor for health and safety.

## 2018-05-03 NOTE — PROGRESS NOTES
Problem: Manic Behavior (Adult/Pediatric)  Goal: *STG: Participates in treatment plan  Outcome: Progressing Towards Goal  Pt is compliant with medications and meals. Pt is visible on the unit. Pt mood and affect is labile. Pt required redirection for getting loud with the MD and  during treatment team.  Pt is verbally aggressive making statements about fighting several people. Will continue to monitor pt q15 minutes.

## 2018-05-03 NOTE — BH NOTES
BEHAVIORAL HEALTH RESTRAINT/SECLUSION INITIAL ASSESSMENT      1. Assessment of High Risk factors: Preexisting medical conditions that places patient at greater risk when placed in restraints are as follows: None    2. Preexisting history of psychological conditions that place patient at greater risk when placed in restraints: None    3. Current behavior/mental status: Agitated, Threatening physical abuse and fighting    4. Initial use of restraint for this patient is justified by: Imminent risk of injury to others    5. Least restrictive tools/methods (Check all that apply): Attended comfort needs, Pain relief and PRN medications    6. Criteria for release: No longer verbalizing threats of harm to self or others    7. Treatment plan revisions to assist the patient in regaining control: Anger assessment and Medication evaluation    8. Patient consented to family involvement in restraint/seclusion process: No    9. Personal safety search completed: Yes         MD/RN Signature:__Paola Murcia_______________________________  Date:_5/3/18____________      0030 Pt in hallway yelling,cursing and screaming demanding a shot ( Pt had just gotten ativan 1mg po at [phone]) She began striking at staff. Security was here and escorted her to locked seclusion room. There she was monitored 1:1       0130 pt is banging on the restraint door loud,yelling, cursing. She was instructed that she would have to discontinue banging or she would be placed on the restraint bed. She continued banging on the door. Security was called and she was assisted to the bed    0140 pt removed from restraint bed due to her continously removing wrists out of restraint; Continued locked seclusion due to agitated , banging , yelling    0224 Pt continue to kick at door, then placed mattress against window so staff could not view her. Security called and staff and security removed mattress. [place] remains in room. Pt is laughing \"It's crazy. ..everybody is arpit. Eura Randell Eura Randell I'm going to beat your ass. Eura Randell Eura Randell Eura Randell \"      0300 Pt continue to bang at door. She has no insight related to what is needed to come back into the [place] safely when it was explained to her.  Continue to curse and threaten staff

## 2018-05-03 NOTE — H&P
INITIAL PSYCHIATRIC EVALUATION            IDENTIFICATION:    Patient Name  Charmaine Astudillo   Date of Birth 1980   [facility] [id number]   Medical Record Number  [id number]      Age  41 y.o. PCP Rosa Sanchez MD   Admit date:  2018    Room Number  761/02  @ [facility]   Date of Service  2018            HISTORY         REASON FOR HOSPITALIZATION:  CC: \"ddon't want to talk\". Pt admitted under a temporary longterm order (TDO) for threatening harm to others and mood swing proving to be an imminent danger to self and others . HISTORY OF PRESENT ILLNESS:    The patient, Charmaine Astudillo, is a 41 y.o. BLACK OR  female with a past psychiatric history significant for bipolar vs adjustment d/o, who presents at this time with complaints of (and/or evidence of) the following emotional symptoms: homicidal thoughts/threats. Additional symptomatology include irritable and labile mood and agitation. Pt received prn medication and utilized seclusion due to aggressive behavior. Pt reported to have been threatening her stepdaughter and her mother. She is refusing to speak with the tx team and appear sedated due to medication effect. The above symptoms have been present for few days. These symptoms are of severe severity. These symptoms are intermittent/ fleeting in nature. The patient's condition has been precipitated by and psychosocial stressors ( ). Patient's condition made worse by continued illicit drug use and treatment noncompliance. UDS: +MJ ; BAL=0. ALLERGIES: No Known Allergies   MEDICATIONS PRIOR TO ADMISSION:   No prescriptions prior to admission.       PAST MEDICAL HISTORY:   Past Medical History:   Diagnosis Date    Anxiety     Hypertension     Other ill-defined conditions(799.89)     Hydrocephalus - mild    Psychiatric disorder     Anxiety     Past Surgical History:   Procedure Laterality Date    HX  SECTION  00,03    x2    HX HEENT      LP to relieve right eye pressure    HX HYSTERECTOMY      Partial    HX LAPAROSCOPIC SUPRACERVICAL HYSTERECTOMY  04/03/14    HX OTHER SURGICAL  11/2013    Spinal Tap    HX TUBAL LIGATION        SOCIAL HISTORY:    Social History     Social History    Marital status: SINGLE     Spouse name: N/A    Number of children: N/A    Years of education: N/A     Occupational History    Not on file. Social History Main Topics    Smoking status: Former Smoker     Quit date: 3/27/2012    Smokeless tobacco: Never Used      Comment: two times weekly    Alcohol use No      Comment: 4/month    Drug use: Yes     Special: Marijuana      Comment: \"i have 5 blunts today. \" pt refused to answer any PMH questions.  Sexual activity: Yes     Partners: Male     Birth control/ protection: Surgical      Comment: Tubal Ligation     Other Topics Concern    Not on file     Social History Narrative    No narrative on file      FAMILY HISTORY: History reviewed. No pertinent family history. Family History   Problem Relation Age of Onset    Diabetes Maternal Grandmother     Stroke Maternal Grandmother        REVIEW OF SYSTEMS:   Psychological ROS: positive for - behavioral disorder, concentration difficulties, hostility and mood swings  Pertinent items are noted in the History of Present Illness. All other Systems reviewed and are considered negative. MENTAL STATUS EXAM & VITALS     MENTAL STATUS EXAM (MSE):    MSE FINDINGS ARE WITHIN NORMAL LIMITS (WNL) UNLESS OTHERWISE STATED BELOW. ( ALL OF THE BELOW CATEGORIES OF THE MSE HAVE BEEN REVIEWED (reviewed 5/2/2018) AND UPDATED AS DEEMED APPROPRIATE )  General Presentation age appropriate, passive and uncooperative   Orientation oriented to time, place and person   Vital Signs  See below (reviewed 5/2/2018); Vital Signs (BP, Pulse, & Temp) are within normal limits if not listed below.    Gait and Station Stable/steady, no ataxia   Musculoskeletal System No extrapyramidal symptoms (EPS); no abnormal muscular movements or Tardive Dyskinesia (TD); muscle strength and tone are within normal limits   Language No aphasia or dysarthria   Speech:  hypoverbal   Thought Processes logical; slow rate of thoughts; poor abstract reasoning/computation   Thought Associations blocked    Thought Content free of delusions, free of hallucinations and internally preoccupied   Suicidal Ideations none   Homicidal Ideations none and contracts for safety   Mood:  hostile  and irritable   Affect:  inappropriate, increased in intensity and labile   Memory recent  impaired   Memory remote:  intact   Concentration/Attention:  distractable   Fund of Knowledge average   Insight:  limited   Reliability poor   Judgment:  poor          VITALS:     Patient Vitals for the past 24 hrs:   Temp Pulse Resp BP SpO2   05/02/18 2052 - 96 - 111/62 -   05/01/18 2342 98.3 °F (36.8 °C) (!) 106 18 123/83 -   05/01/18 2255 - 82 16 116/70 100 %     Wt Readings from Last 3 Encounters:   05/01/18 103.9 kg (229 lb)   12/20/17 108.9 kg (240 lb)   10/05/17 108.6 kg (239 lb 8 oz)     Temp Readings from Last 3 Encounters:   05/01/18 98.3 °F (36.8 °C)   12/20/17 98.4 °F (36.9 °C)   10/05/17 99.1 °F (37.3 °C)     BP Readings from Last 3 Encounters:   05/02/18 111/62   12/20/17 107/55   10/05/17 125/76     Pulse Readings from Last 3 Encounters:   05/02/18 96   12/20/17 95   10/05/17 80            DATA     LABORATORY DATA:  Labs Reviewed   CBC WITH AUTOMATED DIFF - Abnormal; Notable for the following:        Result Value    IMMATURE GRANULOCYTES 1 (*)     ABS. IMM.  GRANS. 0.1 (*)     All other components within normal limits   METABOLIC PANEL, BASIC - Abnormal; Notable for the following:     Glucose 111 (*)     BUN/Creatinine ratio 11 (*)     All other components within normal limits   DRUG SCREEN, URINE - Abnormal; Notable for the following:     THC (TH-CANNABINOL) POSITIVE (*)     All other components within normal limits   URINALYSIS W/ REFLEX CULTURE - Abnormal; Notable for the following:     Appearance TURBID (*)     Ketone 40 (*)     Leukocyte Esterase MODERATE (*)     Epithelial cells MANY (*)     Bacteria 1+ (*)     UA:UC IF INDICATED URINE CULTURE ORDERED (*)     All other components within normal limits   CULTURE, URINE   ETHYL ALCOHOL   SAMPLES BEING HELD   HCG URINE, QL. - POC     Admission on 05/01/2018   Component Date Value Ref Range Status    WBC 05/01/2018 10.2  3.6 - 11.0 K/uL Final    RBC 05/01/2018 4.24  3.80 - 5.20 M/uL Final    HGB 05/01/2018 13.2  11.5 - 16.0 g/dL Final    HCT 05/01/2018 38.7  35.0 - 47.0 % Final    MCV 05/01/2018 91.3  80.0 - 99.0 FL Final    MCH 05/01/2018 31.1  26.0 - 34.0 PG Final    MCHC 05/01/2018 34.1  30.0 - 36.5 g/dL Final    RDW 05/01/2018 12.4  11.5 - 14.5 % Final    PLATELET 88/50/7393 203  150 - 400 K/uL Final    MPV 05/01/2018 10.5  8.9 - 12.9 FL Final    NRBC 05/01/2018 0.0  0  WBC Final    ABSOLUTE NRBC 05/01/2018 0.00  0.00 - 0.01 K/uL Final    NEUTROPHILS 05/01/2018 74  32 - 75 % Final    LYMPHOCYTES 05/01/2018 19  12 - 49 % Final    MONOCYTES 05/01/2018 6  5 - 13 % Final    EOSINOPHILS 05/01/2018 0  0 - 7 % Final    BASOPHILS 05/01/2018 0  0 - 1 % Final    IMMATURE GRANULOCYTES 05/01/2018 1* 0.0 - 0.5 % Final    ABS. NEUTROPHILS 05/01/2018 7.6  1.8 - 8.0 K/UL Final    ABS. LYMPHOCYTES 05/01/2018 2.0  0.8 - 3.5 K/UL Final    ABS. MONOCYTES 05/01/2018 0.6  0.0 - 1.0 K/UL Final    ABS. EOSINOPHILS 05/01/2018 0.0  0.0 - 0.4 K/UL Final    ABS. BASOPHILS 05/01/2018 0.0  0.0 - 0.1 K/UL Final    ABS. IMM.  GRANS. 05/01/2018 0.1* 0.00 - 0.04 K/UL Final    DF 05/01/2018 AUTOMATED    Final    Sodium 05/01/2018 140  136 - 145 mmol/L Final    Potassium 05/01/2018 3.7  3.5 - 5.1 mmol/L Final    Chloride 05/01/2018 103  97 - 108 mmol/L Final    CO2 05/01/2018 22  21 - 32 mmol/L Final    Anion gap 05/01/2018 15  5 - 15 mmol/L Final    Glucose 05/01/2018 111* 65 - 100 mg/dL Final  BUN 05/01/2018 10  6 - 20 MG/DL Final    Creatinine 05/01/2018 0.87  0.55 - 1.02 MG/DL Final    BUN/Creatinine ratio 05/01/2018 11* 12 - 20   Final    GFR est AA 05/01/2018 >60  >60 ml/min/1.73m2 Final    GFR est non-AA 05/01/2018 >60  >60 ml/min/1.73m2 Final    Calcium 05/01/2018 9.7  8.5 - 10.1 MG/DL Final    ALCOHOL(ETHYL),SERUM 05/01/2018 <10  <10 MG/DL Final    AMPHETAMINES 05/01/2018 NEGATIVE   NEG   Final    BARBITURATES 05/01/2018 NEGATIVE   NEG   Final    BENZODIAZEPINES 05/01/2018 NEGATIVE   NEG   Final    COCAINE 05/01/2018 NEGATIVE   NEG   Final    METHADONE 05/01/2018 NEGATIVE   NEG   Final    OPIATES 05/01/2018 NEGATIVE   NEG   Final    PCP(PHENCYCLIDINE) 05/01/2018 NEGATIVE   NEG   Final    THC (TH-CANNABINOL) 05/01/2018 POSITIVE* NEG   Final    Drug screen comment 05/01/2018 (NOTE)   Final    Color 05/01/2018 YELLOW/STRAW    Final    Appearance 05/01/2018 TURBID* CLEAR   Final    Specific gravity 05/01/2018 1.010  1.003 - 1.030   Final    pH (UA) 05/01/2018 6.0  5.0 - 8.0   Final    Protein 05/01/2018 NEGATIVE   NEG mg/dL Final    Glucose 05/01/2018 NEGATIVE   NEG mg/dL Final    Ketone 05/01/2018 40* NEG mg/dL Final    Bilirubin 05/01/2018 NEGATIVE   NEG   Final    Blood 05/01/2018 NEGATIVE   NEG   Final    Urobilinogen 05/01/2018 0.2  0.2 - 1.0 EU/dL Final    Nitrites 05/01/2018 NEGATIVE   NEG   Final    Leukocyte Esterase 05/01/2018 MODERATE* NEG   Final    WBC 05/01/2018 5-10  0 - 4 /hpf Final    RBC 05/01/2018 0-5  0 - 5 /hpf Final    Epithelial cells 05/01/2018 MANY* FEW /lpf Final    Bacteria 05/01/2018 1+* NEG /hpf Final    UA:UC IF INDICATED 05/01/2018 URINE CULTURE ORDERED* CNI   Final    Pregnancy test,urine (POC) 05/01/2018 NEGATIVE   NEG   Final        RADIOLOGY REPORTS:    Results from Hospital Encounter encounter on 09/24/15   XR ABD ACUTE W 1 V CHEST   Narrative **Final Report**      ICD Codes / Adm. Diagnosis: 870269  787.02 / Abdominal Pain Abdominal Pain  Examination:  CR ABDOMEN ACUTE W PA CHEST  - [id number] - Sep 24 2015  3:10PM  Accession No:  [id number]  Reason:  Abdominal Pain      REPORT:  EXAM:  CR ABDOMEN ACUTE W PA CHEST    INDICATION:  Abdominal Pain    COMPARISON: 2013. FINDINGS: The upright chest radiograph demonstrates clear lungs and normal   cardiac and mediastinal contours. There is no pleural effusion or free air   under the diaphragm. Supine and upright views of the abdomen demonstrate a nonobstructive bowel   gas pattern. There is no free intraperitoneal air. No soft tissue masses or   pathologic calcifications are identified. The bones are within normal   limits. IMPRESSION: No free air. Gas pattern is within normal limits. Signing/Reading Doctor: León Porras ([id number])    Approved: León Porras ([id number])  Sep 24 2015  3:21PM                                No results found.            MEDICATIONS       ALL MEDICATIONS  Current Facility-Administered Medications   Medication Dose Route Frequency    ziprasidone (GEODON) 20 mg in sterile water (preservative free) 1 mL injection  20 mg IntraMUSCular BID PRN    OLANZapine (ZyPREXA) tablet 5 mg  5 mg Oral Q6H PRN    benztropine (COGENTIN) tablet 2 mg  2 mg Oral BID PRN    benztropine (COGENTIN) injection 2 mg  2 mg IntraMUSCular BID PRN    LORazepam (ATIVAN) injection 2 mg  2 mg IntraMUSCular Q4H PRN    LORazepam (ATIVAN) tablet 1 mg  1 mg Oral Q4H PRN    zolpidem (AMBIEN) tablet 10 mg  10 mg Oral QHS PRN    acetaminophen (TYLENOL) tablet 650 mg  650 mg Oral Q4H PRN    ibuprofen (MOTRIN) tablet 400 mg  400 mg Oral Q8H PRN    magnesium hydroxide (MILK OF MAGNESIA) 400 mg/5 mL oral suspension 30 mL  30 mL Oral DAILY PRN    nicotine (NICODERM CQ) 21 mg/24 hr patch 1 Patch  1 Patch TransDERmal DAILY PRN      SCHEDULED MEDICATIONS  Current Facility-Administered Medications   Medication Dose Route Frequency                ASSESSMENT & PLAN        The patient, Luther Timmons, is a 41 y.o.  female who presents at this time for treatment of the following diagnoses:  Patient Active Hospital Problem List:   Psychosis (5/2/2018)- bipolar vs adjustment disorder    Assessment: limited info- has previous psych admission. Threatening harm to others- has been aggressive since admission    Plan: prn med for now- consider AP. Need collateral info          I will continue to monitor blood levels (Depakote, Tegretol, lithium, clozapine---a drug with a narrow therapeutic index= NTI) and associated labs for drug therapy implemented that require intense monitoring for toxicity as deemed appropriate based on current medication side effects and pharmacodynamically determined drug 1/2 lives. A coordinated, multidisplinary treatment team (includes the nurse, unit pharmcist,  and writer) round was conducted for this initial evaluation with the patient present. The following regarding medications was addressed during rounds with patient:   the risks and benefits of the proposed medication. The patient was given the opportunity to ask questions. Informed consent given to the use of the above medications. I will continue to adjust psychiatric and non-psychiatric medications (see above \"medication\" section and orders section for details) as deemed appropriate & based upon diagnoses and response to treatment. I have reviewed admission (and previous/old) labs and medical tests in the EHR and or transferring hospital documents. I will continue to order blood tests/labs and diagnostic tests as deemed appropriate and review results as they become available (see orders for details). I have reviewed old psychiatric and medical records available in the EHR. I Will order additional psychiatric records from other institutions to further elucidate the nature of patient's psychopathology and review once available.     I will gather additional collateral information from friends, family and o/p treatment team to further elucidate the nature of patient's psychopathology and baselline level of psychiatric functioning.       ESTIMATED LENGTH OF STAY:    tbd       STRENGTHS:  Access to housing/residential stability                                        SIGNED:    Yeyo Tristan MD  5/2/2018

## 2018-05-03 NOTE — BH NOTES
PSYCHIATRIC PROGRESS NOTE         Patient Name  Katya Ortiz   Date of Birth 1980   [facility] [id number]   Medical Record Number  [id number]      Age  41 y.o. PCP Adeola Fraire MD   Admit date:  5/1/2018    Room Number  512/28  @ [facility]   Date of Service  5/3/2018           E & M PROGRESS NOTE:         HISTORY       CC:  \"I went to police station\"  HISTORY OF PRESENT ILLNESS/INTERVAL HISTORY:  (reviewed/updated 5/3/2018. per initial evaluation: The patient, Katya Ortiz, is a 41 y.o. BLACK OR  female with a past psychiatric history significant for bipolar vs adjustment d/o, who presents at this time with complaints of (and/or evidence of) the following emotional symptoms: homicidal thoughts/threats. Additional symptomatology include irritable and labile mood and agitation. Pt received prn medication and utilized seclusion due to aggressive behavior. Pt reported to have been threatening her stepdaughter and her mother. She is refusing to speak with the tx team and appear sedated due to medication effect. The above symptoms have been present for few days. These symptoms are of severe severity. These symptoms are intermittent/ fleeting in nature. The patient's condition has been precipitated by and psychosocial stressors ( ). Patient's condition made worse by continued illicit drug use and treatment noncompliance. UDS: +MJ ; BAL=0. Brian Horn presents/reports/evidences the following emotional symptoms today, 5/3/2018:agitation and homicidal thoughts/threats. The above symptoms have been present for few days. These symptoms are of severe severity. The symptoms are intermittent/ fleeting in nature. Additional symptomatology and features include anger outburst and threatening behavior leading to seclusion and use of prn medication. Now pt is alert and talking about her recent admission. Mood swings, profane and labile mood. Denies psychosis .  No active SI/HI- stating she went to police station as she was concerned about harming her step daughter      SIDE EFFECTS: (reviewed/updated 5/3/2018)  None reported or admitted to. No noted toxicity with use of current med   ALLERGIES:(reviewed/updated 5/3/2018)  No Known Allergies   MEDICATIONS PRIOR TO ADMISSION:(reviewed/updated 5/3/2018)  No prescriptions prior to admission. PAST MEDICAL HISTORY: Past medical history from the initial psychiatric evaluation has been reviewed (reviewed/updated ) with no additional updates (I asked patient and no additional past medical history provided). Past Medical History:   Diagnosis Date    Anxiety     Hypertension     Other ill-defined conditions(799.89)     Hydrocephalus - mild    Psychiatric disorder     Anxiety     Past Surgical History:   Procedure Laterality Date    HX  SECTION  ,03    x2    HX ENT      LP to relieve right eye pressure    HX HYSTERECTOMY      Partial    HX LAPAROSCOPIC SUPRACERVICAL HYSTERECTOMY  14    HX OTHER SURGICAL  2013    Spinal Tap    HX TUBAL LIGATION        SOCIAL HISTORY: Social history from the initial psychiatric evaluation has been reviewed (reviewed/updated 5/3/2018) with no additional updates (I asked patient and no additional social history provided). Social History     Social History    Marital status: SINGLE     Spouse name: N/A    Number of children: N/A    Years of education: N/A     Occupational History    Not on file. Social History Main Topics    Smoking status: Former Smoker     Quit date: 3/27/2012    Smokeless tobacco: Never Used      Comment: two times weekly    Alcohol use No      Comment: 4/month    Drug use: Yes     Special: Marijuana      Comment: \"i have 5 blunts today. \" pt refused to answer any PMH questions.     Sexual activity: Yes     Partners: Male     Birth control/ protection: Surgical      Comment: Tubal Ligation     Other Topics Concern    Not on file     Social History Narrative    No narrative on file      FAMILY HISTORY: Family history from the initial psychiatric evaluation has been reviewed (reviewed/updated ) with no additional updates (I asked patient and no additional family history provided). Family History   Problem Relation Age of Onset    Diabetes Maternal Grandmother     Stroke Maternal Grandmother        REVIEW OF SYSTEMS: (reviewed/updated 5/3/2018)  Appetite:good   Sleep: fitful   All other Review of Systems: Psychological ROS: positive for - anxiety, behavioral disorder, hostility, irritability and mood swings         [facility] (MSE):    MSE FINDINGS ARE WITHIN NORMAL LIMITS (WNL) UNLESS OTHERWISE STATED BELOW. ( ALL OF THE BELOW CATEGORIES OF THE MSE HAVE BEEN REVIEWED (reviewed 5/4/2018) AND UPDATED ASDEEMED APPROPRIATE )  General Presentation age appropriate and casually dressed, evasive and unreliable   Orientation disorganized   Vital Signs  See below (reviewed ); Vital Signs (BP, Pulse, & Temp) are within normal limits if not listed below.    Gait and Station Stable/steady, no ataxia   Musculoskeletal System No extrapyramidal symptoms (EPS); no abnormal muscular movements or Tardive Dyskinesia (TD); muscle strength and tone are within normal limits   Language No aphasia or dysarthria   Speech:  hyperverbal and loud   Thought Processes logical; normal rate of thoughts; fair abstract reasoning/computation   Thought Associations circumstantial   Thought Content free of delusions, free of hallucinations and preoccupations   Suicidal Ideations none   Homicidal Ideations contracts for safety   Mood:  hostile  and labile    Affect:  hostile, labile and mood-congruent   Memory recent  intact   Memory remote:  intact   Concentration/Attention:  distractable   Fund of Knowledge average   Insight:  poor   Reliability poor   Judgment:  limited          VITALS:     Patient Vitals for the past 24 hrs:   Temp Pulse Resp BP 05/04/18 0649 98.6 °F (37 °C) (!) 107 16 (!) 89/65     Wt Readings from Last 3 Encounters:   05/01/18 103.9 kg (229 lb)   12/20/17 108.9 kg (240 lb)   10/05/17 108.6 kg (239 lb 8 oz)     Temp Readings from Last 3 Encounters:   05/04/18 98.6 °F (37 °C)   12/20/17 98.4 °F (36.9 °C)   10/05/17 99.1 °F (37.3 °C)     BP Readings from Last 3 Encounters:   05/04/18 (!) 89/65   12/20/17 107/55   10/05/17 125/76     Pulse Readings from Last 3 Encounters:   05/04/18 (!) 107   12/20/17 95   10/05/17 80            DATA     LABORATORY DATA:(reviewed/updated )  No results found for this or any previous visit (from the past 24 hour(s)). No results found for: VALF2, VALAC, VALP, VALPR, DS6, CRBAM, CRBAMP, CARB2, XCRBAM  No results found for: LITHM   RADIOLOGY REPORTS:(reviewed/updated )  No results found.        MEDICATIONS     ALL MEDICATIONS:   Current Facility-Administered Medications   Medication Dose Route Frequency    divalproex ER (DEPAKOTE ER) 24 hour tablet 1,500 mg  1,500 mg Oral QHS    hydrOXYzine pamoate (VISTARIL) capsule 25 mg  25 mg Oral TID    hydrOXYzine pamoate (VISTARIL) capsule 50 mg  50 mg Oral TID PRN    ziprasidone (GEODON) 20 mg in sterile water (preservative free) 1 mL injection  20 mg IntraMUSCular BID PRN    OLANZapine (ZyPREXA) tablet 5 mg  5 mg Oral Q6H PRN    benztropine (COGENTIN) tablet 2 mg  2 mg Oral BID PRN    benztropine (COGENTIN) injection 2 mg  2 mg IntraMUSCular BID PRN    LORazepam (ATIVAN) injection 2 mg  2 mg IntraMUSCular Q4H PRN    zolpidem (AMBIEN) tablet 10 mg  10 mg Oral QHS PRN    acetaminophen (TYLENOL) tablet 650 mg  650 mg Oral Q4H PRN    ibuprofen (MOTRIN) tablet 400 mg  400 mg Oral Q8H PRN    magnesium hydroxide (MILK OF MAGNESIA) 400 mg/5 mL oral suspension 30 mL  30 mL Oral DAILY PRN    nicotine (NICODERM CQ) 21 mg/24 hr patch 1 Patch  1 Patch TransDERmal DAILY PRN      SCHEDULED MEDICATIONS:   Current Facility-Administered Medications   Medication Dose Route Frequency    divalproex ER (DEPAKOTE ER) 24 hour tablet 1,500 mg  1,500 mg Oral QHS    hydrOXYzine pamoate (VISTARIL) capsule 25 mg  25 mg Oral TID          ASSESSMENT & PLAN     DIAGNOSES REQUIRING ACTIVE TREATMENT AND MONITORING: (reviewed/updated 5/3/2018)  Patient Active Hospital Problem List:   Bipolar disorder ([facility] (5/3/2018)    Assessment: hypomania vs substance induced using alcohol and THC. Labile, anger outburst, poor impulse control    Plan: I will ct to adjust med- Depakote   Agitation (5/3/2018)    Assessment: sig since admission- received prn med and utilized seclusion for safety to others Rupal Kamara    Plan: prn meds   Marijuana abuse (5/3/2018)    Assessment: regular use, also alcohol abuse    Plan: educate, rehab   Antisocial personality disorder (5/3/2018)    Assessment: several legal issue, angry, entitled and poor impulse control    Plan: limit setting            I will continue to monitor blood levels (Depakote, Tegretol, lithium, clozapine---a drug with a narrow therapeutic index= NTI) and associated labs for drug therapy implemented that require intense monitoring for toxicity as deemed appropriate based on current medication side effects and pharmacodynamically determined drug 1/2 lives. In summary, Kash Estrada, is a 41 y.o.  female who presents with a severe exacerbation of the principal diagnosis of Bipolar disorder ([facility]  Patient's condition is worsening/not improving/not stable . Patient requires continued inpatient hospitalization for further stabilization, safety monitoring and medication management. I will continue to coordinate the provision of individual, milieu, occupational, group, and substance abuse therapies to address target symptoms/diagnoses as deemed appropriate for the individual patient. A coordinated, multidisplinary treatment team round was conducted with the patient (this team consists of the nurse, psychiatric unit pharmcist,  and writer). Complete current electronic health record for patient has been reviewed today including consultant notes, ancillary staff notes, nurses and psychiatric tech notes. Suicide risk assessment completed and patient deemed to be of low risk for suicide at this time. The following regarding medications was addressed during rounds with patient:   the risks and benefits of the proposed medication. The patient was given the opportunity to ask questions. Informed consent given to the use of the above medications. Will continue to adjust psychiatric and non-psychiatric medications (see above \"medication\" section and orders section for details) as deemed appropriate & based upon diagnoses and response to treatment. I will continue to order blood tests/labs and diagnostic tests as deemed appropriate and review results as they become available (see orders for details and above listed lab/test results). I will order psychiatric records from previous [facility] hospitals to further elucidate the nature of patient's psychopathology and review once available. I will gather additional collateral information from friends, family and o/p treatment team to further elucidate the nature of patient's psychopathology and baselline level of psychiatric functioning. I certify that this patient's inpatient psychiatric hospital services furnished since the previous certification were, and continue to be, required for treatment that could reasonably be expected to improve the patient's condition, or for diagnostic study, and that the patient continues to need, on a daily basis, active treatment furnished directly by or requiring the supervision of inpatient psychiatric facility personnel. In addition, the hospital records show that services furnished were intensive treatment services, admission or related services, or equivalent services.     EXPECTED DISCHARGE DATE/DAY: TBD     DISPOSITION: Home       Signed By:   Andreas Jenkins REJI Guy MD  5/4/2018

## 2018-05-03 NOTE — BH NOTES
BEHAVIORAL HEALTH RESTRAINT/SECLUSION PROGRESS NOTE TERMINATION OF RESTRAINT/SECLUSION    1. Current mental status/behavior: Calm    2. Criteria for release met: No longer verbalizing threats of harm to self and others    3. Additional interventions to prevent recurrence of dangerous behaviors include the following in addition to the debriefing process by the team.   Report to treatment team to  Initiate safety plan for pt        RN Signature_Paola Ledesma  RN_________________________________ Date_5/3/18______________      8061D Pt observed in seclusion asleep, calm Unlocked door

## 2018-05-03 NOTE — BH NOTES
GROUP THERAPY PROGRESS NOTE    Charmaine Astudillo is participating in [place].      Group time: 30 minutes    Personal goal for participation: reality orientation    Goal orientation: community    Group therapy participation: passive    Therapeutic interventions reviewed and discussed: yes    Impression of participation: fair

## 2018-05-03 NOTE — BH NOTES
GROUP THERAPY PROGRESS NOTE    The patient Bret Johnson a 41 y.o. female is participating in [employer]. Group time: 1 hour    Personal goal for participation: To concentrate on selected task    Goal orientation: social    Group therapy participation: active    Therapeutic interventions reviewed and discussed: Crafts, games, music    Impression of participation: The patient was attentive.     Pako Herbert  5/3/2018  5:34 PM

## 2018-05-03 NOTE — BH NOTES
GROUP THERAPY PROGRESS NOTE    The patient Veronica Ket a 41 y.o. female is participating in Coping Skills Group. Group time: 45 minutes    Personal goal for participation:  To participate in relaxation activity    Goal orientation:  relaxation    Group therapy participation: minimal    Therapeutic interventions reviewed and discussed: favorite ways to relax    Impression of participation:  The patient was present-arrived late    Gela Mcintyre  5/3/2018  1:57 PM

## 2018-05-03 NOTE — BH NOTES
Social Work     Pt was seen in treatment team this morning. Pt was able to provide more information today as she was alert and oriented. Pt reported that she went to the police station to ask to be arrested because she didn't to  Fight her stepdaughter, apparently each person went back and forth via text threatening each other. Pt reported that she is currently on probation for a DUI charge 2 years ago. She reported she uses marijuana on a daily basis to help keep her calm, she also takes 2 shots of liquor. Pt is employed at a company in [place] [employer] and she reports that she has difficulty getting along with her co-workers. Pt has her own home, she has two children ages 16 & 15. She was informed that at duty to warn will be done on the day of discharge. [facility] Department was contacted to inquire about the process for the pt checking-in while she is in the hospital, clinician left a message. Pt also requested that her leasing office be called because her rent was due. The office will put a note of the pt's file, however she still may accure a late fee next week Monday. Pt was informed about all of the stated information.

## 2018-05-03 NOTE — BH NOTES
GROUP THERAPY PROGRESS NOTE    Gracie Man is participating in [place].      Group time: 30 minutes    Personal goal for participation:  Unit orientation    Goal orientation: Community    Group therapy participation: active    Therapeutic interventions reviewed and discussed: Yes    Impression of participation: good

## 2018-05-04 PROCEDURE — 65220000003 HC RM SEMIPRIVATE PSYCH

## 2018-05-04 PROCEDURE — 74011250637 HC RX REV CODE- 250/637

## 2018-05-04 PROCEDURE — 74011250637 HC RX REV CODE- 250/637: Performed by: PSYCHIATRY & NEUROLOGY

## 2018-05-04 RX ORDER — HYDROXYZINE PAMOATE 25 MG/1
25 CAPSULE ORAL 3 TIMES DAILY
Status: DISCONTINUED | OUTPATIENT
Start: 2018-05-04 | End: 2018-05-07

## 2018-05-04 RX ORDER — DIVALPROEX SODIUM 500 MG/1
1500 TABLET, EXTENDED RELEASE ORAL
Status: DISCONTINUED | OUTPATIENT
Start: 2018-05-04 | End: 2018-05-07 | Stop reason: HOSPADM

## 2018-05-04 RX ORDER — DIVALPROEX SODIUM 500 MG/1
1000 TABLET, DELAYED RELEASE ORAL 2 TIMES DAILY
Status: DISCONTINUED | OUTPATIENT
Start: 2018-05-04 | End: 2018-05-04

## 2018-05-04 RX ORDER — HYDROXYZINE PAMOATE 25 MG/1
50 CAPSULE ORAL
Status: DISCONTINUED | OUTPATIENT
Start: 2018-05-04 | End: 2018-05-07 | Stop reason: HOSPADM

## 2018-05-04 RX ADMIN — MAGNESIUM HYDROXIDE 30 ML: 400 SUSPENSION ORAL at 08:33

## 2018-05-04 RX ADMIN — LORAZEPAM 1 MG: 1 TABLET ORAL at 07:10

## 2018-05-04 RX ADMIN — OLANZAPINE 5 MG: 5 TABLET, FILM COATED ORAL at 23:04

## 2018-05-04 RX ADMIN — HYDROXYZINE PAMOATE 25 MG: 25 CAPSULE ORAL at 16:30

## 2018-05-04 RX ADMIN — HYDROXYZINE PAMOATE 50 MG: 25 CAPSULE ORAL at 22:24

## 2018-05-04 RX ADMIN — HYDROXYZINE PAMOATE 25 MG: 25 CAPSULE ORAL at 11:41

## 2018-05-04 RX ADMIN — ZOLPIDEM TARTRATE 10 MG: 10 TABLET ORAL at 23:04

## 2018-05-04 RX ADMIN — DIVALPROEX SODIUM 1500 MG: 500 TABLET, FILM COATED, EXTENDED RELEASE ORAL at 21:12

## 2018-05-04 RX ADMIN — DIVALPROEX SODIUM 500 MG: 500 TABLET, DELAYED RELEASE ORAL at 07:10

## 2018-05-04 NOTE — PROGRESS NOTES
Problem: Manic Behavior (Adult/Pediatric)  Goal: *STG: Participates in treatment plan  Outcome: Progressing Towards Goal  Nsg note 7-3p shift: Pt is alert and oriented. Mood has been labile. Pt has been more appropriate and cooperative today. She has been meal, med, and group compliant. She received prns for anxiety and constipation, in which both meds were effective. Her family came up today in order to retrievet pt's valuables. Pt denies SI/HI and contracts for safety. She denies hallucinations. Staff will continue to monitor pt's safety and offer support as needed.

## 2018-05-04 NOTE — BH NOTES
GROUP THERAPY PROGRESS NOTE    The patient Alicia crisostomo 41 y.o. female is participating in Coping Skills Group. Group time: 45 minutes    Personal goal for participation: To participate in relaxation activity    Goal orientation:  relaxation    Group therapy participation: active    Therapeutic interventions reviewed and discussed: favorite ways to relax    Impression of participation:  The patient was attentive.     Gurpreet Joseph  5/4/2018  1:55 PM

## 2018-05-04 NOTE — PROGRESS NOTES
Problem: Manic Behavior (Adult/Pediatric)  Goal: *STG: Participates in treatment plan  Outcome: Progressing Towards Goal  Pt visible on unit with appropriate interactions with staff and peers. She talked to writer at nurses station and stated she felt better on her medication . She said she is looking forward to discharge. I stated she she have good behavior during the weekend and take meds and talk to MD on Monday.  She agreed

## 2018-05-04 NOTE — BH NOTES
PSYCHIATRIC PROGRESS NOTE         Patient Name  Acacia Grace   Date of Birth 1980   CSN [id number]   Medical Record Number  [id number]      Age  41 y.o. PCP Zipporah Lanes, MD   Admit date:  5/1/2018    Room Number  316/01  @ [facility]   Date of Service  5/4/2018           E & M PROGRESS NOTE:         HISTORY       CC:  \"I want to go home\"  HISTORY OF PRESENT ILLNESS/INTERVAL HISTORY:  (reviewed/updated 5/4/2018). per initial evaluation: The patient, Acacia Grace, is a 41 y.o. BLACK OR  female with a past psychiatric history significant for bipolar vs adjustment d/o, who presents at this time with complaints of (and/or evidence of) the following emotional symptoms: homicidal thoughts/threats. Additional symptomatology include irritable and labile mood and agitation. Pt received prn medication and utilized seclusion due to aggressive behavior. Pt reported to have been threatening her stepdaughter and her mother. She is refusing to speak with the tx team and appear sedated due to medication effect. The above symptoms have been present for few days. These symptoms are of severe severity. These symptoms are intermittent/ fleeting in nature. The patient's condition has been precipitated by and psychosocial stressors ( ). Patient's condition made worse by continued illicit drug use and treatment noncompliance. UDS: +MJ ; BAL=0. Ignacio Horn presents/reports/evidences the following emotional symptoms today, 5/4/2018:agitation. The above symptoms have been present for few days. These symptoms are of moderate severity. The symptoms are constant in nature. Additional symptomatology and features include irritable and labile mood, anger outburst and poor impulse control. Preoccupied with dc. She has limited insight and preoccupied with disscharge. No active SI/HI. Remains unpredicatble       SIDE EFFECTS: (reviewed/updated 5/4/2018)  None reported or admitted to.   No noted toxicity with use of Depakote/Tegretol/lithium/Clozaril/TCAs   ALLERGIES:(reviewed/updated 2018)  No Known Allergies   MEDICATIONS PRIOR TO ADMISSION:(reviewed/updated 2018)  No prescriptions prior to admission. PAST MEDICAL HISTORY: Past medical history from the initial psychiatric evaluation has been reviewed (reviewed/updated 2018) with no additional updates (I asked patient and no additional past medical history provided). Past Medical History:   Diagnosis Date    Anxiety     Hypertension     Other ill-defined conditions(799.89)     Hydrocephalus - mild    Psychiatric disorder     Anxiety     Past Surgical History:   Procedure Laterality Date    HX  SECTION  00,03    x2    HX ENT      LP to relieve right eye pressure    HX HYSTERECTOMY      Partial    HX LAPAROSCOPIC SUPRACERVICAL HYSTERECTOMY  14    HX OTHER SURGICAL  2013    [employer]    HX TUBAL LIGATION        SOCIAL HISTORY: Social history from the initial psychiatric evaluation has been reviewed (reviewed/updated 2018) with no additional updates (I asked patient and no additional social history provided). Social History     Social History    Marital status: SINGLE     Spouse name: N/A    Number of children: N/A    Years of education: N/A     Occupational History    Not on file. Social History Main Topics    Smoking status: Former Smoker     Quit date: 3/27/2012    Smokeless tobacco: Never Used      Comment: two times weekly    Alcohol use No      Comment: 4/month    Drug use: Yes     Special: Marijuana      Comment: \"i have 5 blunts today. \" pt refused to answer any PMH questions.     Sexual activity: Yes     Partners: Male     Birth control/ protection: Surgical      Comment: Tubal Ligation     Other Topics Concern    Not on file     Social History Narrative    No narrative on file      FAMILY HISTORY: Family history from the initial psychiatric evaluation has been reviewed (reviewed/updated 5/4/2018) with no additional updates (I asked patient and no additional family history provided). Family History   Problem Relation Age of Onset    Diabetes Maternal Grandmother     Stroke Maternal Grandmother        REVIEW OF SYSTEMS: (reviewed/updated 5/4/2018)  Appetite:no change from normal   Sleep: fitful   All other Review of Systems: Psychological ROS: positive for - anxiety, behavioral disorder, concentration difficulties and irritability         [facility] [place] [place]    MSE FINDINGS ARE WITHIN NORMAL LIMITS (WNL) UNLESS OTHERWISE STATED BELOW. ( ALL OF THE BELOW CATEGORIES OF THE MSE HAVE BEEN REVIEWED (reviewed 5/4/2018) AND UPDATED AS DEEMED APPROPRIATE )  General Presentation age appropriate and casually dressed, evasive   Orientation oriented to time, place and person   Vital Signs  See below (reviewed 5/4/2018); Vital Signs (BP, Pulse, & Temp) are within normal limits if not listed below.    Gait and Station Stable/steady, no ataxia   Musculoskeletal System No extrapyramidal symptoms (EPS); no abnormal muscular movements or Tardive Dyskinesia (TD); muscle strength and tone are within normal limits   Language No aphasia or dysarthria   Speech:  hyperverbal, loud and profane   Thought Processes logical; normal rate of thoughts; fair abstract reasoning/computation   Thought Associations goal directed   Thought Content free of delusions, free of hallucinations and preoccupations   Suicidal Ideations none   Homicidal Ideations none   Mood:  irritable and labile    Affect:  irritable, labile and mood-congruent   Memory recent  intact   Memory remote:  intact   Concentration/Attention:  distractable   Fund of Knowledge average   Insight:  limited   Reliability poor   Judgment:  poor          VITALS:     Patient Vitals for the past 24 hrs:   Temp Pulse Resp BP   05/04/18 1615 98.4 °F (36.9 °C) (!) 105 16 117/81   05/04/18 1317 98.8 °F (37.1 °C) (!) 121 16 127/81   05/04/18 0649 98.6 °F (37 °C) (!) 107 16 (!) 89/65     Wt Readings from Last 3 Encounters:   05/01/18 103.9 kg (229 lb)   12/20/17 108.9 kg (240 lb)   10/05/17 108.6 kg (239 lb 8 oz)     Temp Readings from Last 3 Encounters:   05/04/18 98.4 °F (36.9 °C)   12/20/17 98.4 °F (36.9 °C)   10/05/17 99.1 °F (37.3 °C)     BP Readings from Last 3 Encounters:   05/04/18 117/81   12/20/17 107/55   10/05/17 125/76     Pulse Readings from Last 3 Encounters:   05/04/18 (!) 105   12/20/17 95   10/05/17 80            DATA     LABORATORY DATA:(reviewed/updated 5/4/2018)  No results found for this or any previous visit (from the past 24 hour(s)). No results found for: VALF2, VALAC, VALP, VALPR, DS6, CRBAM, CRBAMP, CARB2, XCRBAM  No results found for: LITHM   RADIOLOGY REPORTS:(reviewed/updated 5/4/2018)  No results found.        MEDICATIONS     ALL MEDICATIONS:   Current Facility-Administered Medications   Medication Dose Route Frequency    divalproex ER (DEPAKOTE ER) 24 hour tablet 1,500 mg  1,500 mg Oral QHS    hydrOXYzine pamoate (VISTARIL) capsule 25 mg  25 mg Oral TID    hydrOXYzine pamoate (VISTARIL) capsule 50 mg  50 mg Oral TID PRN    ziprasidone (GEODON) 20 mg in sterile water (preservative free) 1 mL injection  20 mg IntraMUSCular BID PRN    OLANZapine (ZyPREXA) tablet 5 mg  5 mg Oral Q6H PRN    benztropine (COGENTIN) tablet 2 mg  2 mg Oral BID PRN    benztropine (COGENTIN) injection 2 mg  2 mg IntraMUSCular BID PRN    LORazepam (ATIVAN) injection 2 mg  2 mg IntraMUSCular Q4H PRN    zolpidem (AMBIEN) tablet 10 mg  10 mg Oral QHS PRN    acetaminophen (TYLENOL) tablet 650 mg  650 mg Oral Q4H PRN    ibuprofen (MOTRIN) tablet 400 mg  400 mg Oral Q8H PRN    magnesium hydroxide (MILK OF MAGNESIA) 400 mg/5 mL oral suspension 30 mL  30 mL Oral DAILY PRN    nicotine (NICODERM CQ) 21 mg/24 hr patch 1 Patch  1 Patch TransDERmal DAILY PRN      SCHEDULED MEDICATIONS:   Current Facility-Administered Medications Medication Dose Route Frequency    divalproex ER (DEPAKOTE ER) 24 hour tablet 1,500 mg  1,500 mg Oral QHS    hydrOXYzine pamoate (VISTARIL) capsule 25 mg  25 mg Oral TID          ASSESSMENT & PLAN     DIAGNOSES REQUIRING ACTIVE TREATMENT AND MONITORING: (reviewed/updated 5/4/2018)  Patient Active Hospital Problem List:  Bipolar disorder ([facility] (5/3/2018)    Assessment: hypomania vs substance induced using alcohol and THC. Less Labile, anger outburst, poor impulse control    Plan: I will ct to adjust med-titrate Depakote, add Vistaril for anxiety and agitation     Agitation (5/3/2018)    Assessment: sig since admission- received prn med and utilized seclusion for safety to others [facility]    Plan: prn meds   Marijuana abuse (5/3/2018)    Assessment: regular use, also alcohol abuse    Plan: educate, rehab   Antisocial personality disorder (5/3/2018)    Assessment: several legal issue, angry, entitled and poor impulse control    Plan: limit setting            I will continue to monitor blood levels (Depakote, Tegretol, lithium, clozapine---a drug with a narrow therapeutic index= NTI) and associated labs for drug therapy implemented that require intense monitoring for toxicity as deemed appropriate based on current medication side effects and pharmacodynamically determined drug 1/2 lives. In summary, Pan Zeng, is a 41 y.o.  female who presents with a severe exacerbation of the principal diagnosis of Bipolar disorder ([facility]  Patient's condition is worsening/not improving/not stable  Patient requires continued inpatient hospitalization for further stabilization, safety monitoring and medication management. I will continue to coordinate the provision of individual, milieu, occupational, group, and substance abuse therapies to address target symptoms/diagnoses as deemed appropriate for the individual patient.   A coordinated, multidisplinary treatment team round was conducted with the patient (this team consists of the nurse, psychiatric unit pharmcist,  and writer). Complete current electronic health record for patient has been reviewed today including consultant notes, ancillary staff notes, nurses and psychiatric tech notes. Suicide risk assessment completed and patient deemed to be of low risk for suicide at this time. The following regarding medications was addressed during rounds with patient:   the risks and benefits of the proposed medication. The patient was given the opportunity to ask questions. Informed consent given to the use of the above medications. Will continue to adjust psychiatric and non-psychiatric medications (see above \"medication\" section and orders section for details) as deemed appropriate & based upon diagnoses and response to treatment. I will continue to order blood tests/labs and diagnostic tests as deemed appropriate and review results as they become available (see orders for details and above listed lab/test results). I will order psychiatric records from previous [facility] to further elucidate the nature of patient's psychopathology and review once available. I will gather additional collateral information from friends, family and o/p treatment team to further elucidate the nature of patient's psychopathology and baselline level of psychiatric functioning. I certify that this patient's inpatient psychiatric hospital services furnished since the previous certification were, and continue to be, required for treatment that could reasonably be expected to improve the patient's condition, or for diagnostic study, and that the patient continues to need, on a daily basis, active treatment furnished directly by or requiring the supervision of inpatient psychiatric facility personnel. In addition, the hospital records show that services furnished were intensive treatment services, admission or related services, or equivalent services.     EXPECTED DISCHARGE DATE/DAY: TBD     DISPOSITION: Home       Signed By:   Wilma Leung MD  5/4/2018

## 2018-05-04 NOTE — BH NOTES
Social Work     Clinician left a message for the  Nabila Zavala [phone] ) monitoring the pt's case. The message stated that the pt is unable to use her cellphone while on the unit and asked for a call back with any additional questions.

## 2018-05-04 NOTE — BH NOTES
Pt requested that valuables be picked up from safe from brother. Security to floor. Pt signed for valuables to be sent to brother. Valuable card placed back in chart.

## 2018-05-04 NOTE — BH NOTES
GROUP THERAPY PROGRESS NOTE    The patient Kash crisostomo 41 y.o. female is participating in [employer]. Group time: 1 hour    Personal goal for participation: To concentrate on selected task    Goal orientation: social    Group therapy participation: active    Therapeutic interventions reviewed and discussed: Crafts, games, music    Impression of participation: The patient was attentive.     Montserrat Miller  5/4/2018  5:05 PM

## 2018-05-04 NOTE — PROGRESS NOTES
Pt slept for 6 hours and had no signs or symptoms of distress. Pt had no issues through the night. Q15 safety monitoring rounds continued.

## 2018-05-04 NOTE — PROGRESS NOTES
Problem: Manic Behavior (Adult/Pediatric)  Goal: *STG: Participates in treatment plan  Client came to the desk wanting to know what the procedure was for her to be discharged. Tried to explain the process but she was too busy yelling that she had not seen a Shai Jeronimo while she was here. Then conceded that maybe she did see him that he may have come to her room. Explained that she may write out her request for discharge, given paper and a pencil to do so. Have not seen her since to put the paper on her chart. She also needs a letter at discharge stating she is not allowed to use her cell phone while here to call her . Meal and med compliant, spends most of her time to herself. Q 15 min checks for safety continue. No c/o toothaches today.

## 2018-05-05 PROCEDURE — 74011250637 HC RX REV CODE- 250/637: Performed by: PSYCHIATRY & NEUROLOGY

## 2018-05-05 PROCEDURE — 65220000003 HC RM SEMIPRIVATE PSYCH

## 2018-05-05 PROCEDURE — 74011250636 HC RX REV CODE- 250/636

## 2018-05-05 PROCEDURE — 74011250637 HC RX REV CODE- 250/637

## 2018-05-05 RX ADMIN — DIVALPROEX SODIUM 1500 MG: 500 TABLET, FILM COATED, EXTENDED RELEASE ORAL at 21:40

## 2018-05-05 RX ADMIN — OLANZAPINE 5 MG: 5 TABLET, FILM COATED ORAL at 23:16

## 2018-05-05 RX ADMIN — LORAZEPAM 2 MG: 2 INJECTION INTRAMUSCULAR; INTRAVENOUS at 11:25

## 2018-05-05 RX ADMIN — LORAZEPAM 2 MG: 2 INJECTION INTRAMUSCULAR; INTRAVENOUS at 17:34

## 2018-05-05 RX ADMIN — HYDROXYZINE PAMOATE 25 MG: 25 CAPSULE ORAL at 11:08

## 2018-05-05 RX ADMIN — HYDROXYZINE PAMOATE 25 MG: 25 CAPSULE ORAL at 08:58

## 2018-05-05 RX ADMIN — HYDROXYZINE PAMOATE 25 MG: 25 CAPSULE ORAL at 16:42

## 2018-05-05 RX ADMIN — MAGNESIUM HYDROXIDE 30 ML: 400 SUSPENSION ORAL at 23:58

## 2018-05-05 RX ADMIN — ZOLPIDEM TARTRATE 10 MG: 10 TABLET ORAL at 23:16

## 2018-05-05 RX ADMIN — OLANZAPINE 5 MG: 5 TABLET, FILM COATED ORAL at 05:27

## 2018-05-05 NOTE — BH NOTES
Pt started shift off great not a richards as normal easy going. After breakfast pt became agitated. Pt started to question about discharge and became angry with the use of multiple profane language. Pt received medication form nurse. Pt took a small break in room and came to dayroom in a better mood. Pt ate lunch and socialized with peers. Will continue to monitor the safety of pt Q 15.

## 2018-05-05 NOTE — BH NOTES
PSYCHIATRIC PROGRESS NOTE         Patient Name  Gracie Man   Date of Birth 1980   [facility] [id number]   Medical Record Number  [id number]      Age  41 y.o. PCP Tiffany Villalta MD   Admit date:  5/1/2018    Room Number  803/81  @ [facility]   Date of Service  5/5/2018           E & M PROGRESS NOTE:         HISTORY       CC:  \"I want to go home\"  HISTORY OF PRESENT ILLNESS/INTERVAL HISTORY:  (reviewed/updated 5/5/2018). per initial evaluation: The patient, Gracie Man, is a 41 y.o. BLACK OR  female with a past psychiatric history significant for bipolar vs adjustment d/o, who presents at this time with complaints of (and/or evidence of) the following emotional symptoms: homicidal thoughts/threats. Additional symptomatology include irritable and labile mood and agitation. Pt received prn medication and utilized seclusion due to aggressive behavior. Pt reported to have been threatening her stepdaughter and her mother. She is refusing to speak with the tx team and appear sedated due to medication effect. The above symptoms have been present for few days. These symptoms are of severe severity. These symptoms are intermittent/ fleeting in nature. The patient's condition has been precipitated by and psychosocial stressors ( ). Patient's condition made worse by continued illicit drug use and treatment noncompliance. UDS: +MJ ; BAL=0. Deatra Bal Justina presents/reports/evidences the following emotional symptoms today, 5/5/2018:agitation. The above symptoms have been present for few days. These symptoms are of moderate severity. The symptoms are constant in nature. Additional symptomatology and features include irritable and labile mood, anger outburst and poor impulse control. Preoccupied with dc. She has limited insight and preoccupied with disscharge. No active SI/HI. Remains unpredictable.  Presented very agitated, continuously using profanities, cursing staff for keeping her here. Prn's used-Zyprexa, Vistaril, Ambien. SIDE EFFECTS: (reviewed/updated 2018)  None reported or admitted to. No noted toxicity with use of Depakote/Tegretol/lithium/Clozaril/TCAs   ALLERGIES:(reviewed/updated 2018)  No Known Allergies   MEDICATIONS PRIOR TO ADMISSION:(reviewed/updated 2018)  No prescriptions prior to admission. PAST MEDICAL HISTORY: Past medical history from the initial psychiatric evaluation has been reviewed (reviewed/updated 2018) with no additional updates (I asked patient and no additional past medical history provided). Past Medical History:   Diagnosis Date    Anxiety     Hypertension     Other ill-defined conditions(799.89)     Hydrocephalus - mild    Psychiatric disorder     Anxiety     Past Surgical History:   Procedure Laterality Date    HX  SECTION  00,03    x2    HX ENT      LP to relieve right eye pressure    HX HYSTERECTOMY      Partial    HX LAPAROSCOPIC SUPRACERVICAL HYSTERECTOMY  14    HX OTHER SURGICAL  2013    Spinal Tap    HX TUBAL LIGATION        SOCIAL HISTORY: Social history from the initial psychiatric evaluation has been reviewed (reviewed/updated 2018) with no additional updates (I asked patient and no additional social history provided). Social History     Social History    Marital status: SINGLE     Spouse name: N/A    Number of children: N/A    Years of education: N/A     Occupational History    Not on file. Social History Main Topics    Smoking status: Former Smoker     Quit date: 3/27/2012    Smokeless tobacco: Never Used      Comment: two times weekly    Alcohol use No      Comment: 4/month    Drug use: Yes     Special: Marijuana      Comment: \"i have 5 blunts today. \" pt refused to answer any PMH questions.     Sexual activity: Yes     Partners: Male     Birth control/ protection: Surgical      Comment: Tubal Ligation     Other Topics Concern    Not on file     Social History Narrative    No narrative on file      FAMILY HISTORY: Family history from the initial psychiatric evaluation has been reviewed (reviewed/updated 5/5/2018) with no additional updates (I asked patient and no additional family history provided). Family History   Problem Relation Age of Onset    Diabetes Maternal Grandmother     Stroke Maternal Grandmother        REVIEW OF SYSTEMS: (reviewed/updated 5/5/2018)  Appetite:no change from normal   Sleep: fitful   All other Review of Systems: Psychological ROS: positive for - anxiety, behavioral disorder, concentration difficulties and irritability         [facility] (MSE):    MSE FINDINGS ARE WITHIN NORMAL LIMITS (WNL) UNLESS OTHERWISE STATED BELOW. ( ALL OF THE BELOW CATEGORIES OF THE MSE HAVE BEEN REVIEWED (reviewed 5/5/2018) AND UPDATED AS DEEMED APPROPRIATE )  General Presentation age appropriate and casually dressed, evasive   Orientation oriented to time, place and person   Vital Signs  See below (reviewed 5/5/2018); Vital Signs (BP, Pulse, & Temp) are within normal limits if not listed below.    Gait and Station Stable/steady, no ataxia   Musculoskeletal System No extrapyramidal symptoms (EPS); no abnormal muscular movements or Tardive Dyskinesia (TD); muscle strength and tone are within normal limits   Language No aphasia or dysarthria   Speech:  hyperverbal, loud and profane   Thought Processes logical; normal rate of thoughts; fair abstract reasoning/computation   Thought Associations goal directed   Thought Content free of delusions, free of hallucinations and preoccupations   Suicidal Ideations none   Homicidal Ideations none   Mood:  irritable and labile    Affect:  irritable, labile and mood-congruent   Memory recent  intact   Memory remote:  intact   Concentration/Attention:  distractable   Fund of Knowledge average   Insight:  limited   Reliability poor   Judgment:  poor          VITALS:     Patient Vitals for the past 24 hrs:   Temp Pulse Resp BP   05/05/18 0634 96.8 °F (36 °C) (!) 105 18 (!) 121/100   05/05/18 0048 - - 20 -     Wt Readings from Last 3 Encounters:   05/01/18 103.9 kg (229 lb)   12/20/17 108.9 kg (240 lb)   10/05/17 108.6 kg (239 lb 8 oz)     Temp Readings from Last 3 Encounters:   05/05/18 96.8 °F (36 °C)   12/20/17 98.4 °F (36.9 °C)   10/05/17 99.1 °F (37.3 °C)     BP Readings from Last 3 Encounters:   05/05/18 (!) 121/100   12/20/17 107/55   10/05/17 125/76     Pulse Readings from Last 3 Encounters:   05/05/18 (!) 105   12/20/17 95   10/05/17 80            DATA     LABORATORY DATA:(reviewed/updated 5/5/2018)  No results found for this or any previous visit (from the past 24 hour(s)). No results found for: VALF2, VALAC, VALP, VALPR, DS6, CRBAM, CRBAMP, CARB2, XCRBAM  No results found for: LITHM   RADIOLOGY REPORTS:(reviewed/updated 5/5/2018)  No results found.        MEDICATIONS     ALL MEDICATIONS:   Current Facility-Administered Medications   Medication Dose Route Frequency    divalproex ER (DEPAKOTE ER) 24 hour tablet 1,500 mg  1,500 mg Oral QHS    hydrOXYzine pamoate (VISTARIL) capsule 25 mg  25 mg Oral TID    hydrOXYzine pamoate (VISTARIL) capsule 50 mg  50 mg Oral TID PRN    ziprasidone (GEODON) 20 mg in sterile water (preservative free) 1 mL injection  20 mg IntraMUSCular BID PRN    OLANZapine (ZyPREXA) tablet 5 mg  5 mg Oral Q6H PRN    benztropine (COGENTIN) tablet 2 mg  2 mg Oral BID PRN    benztropine (COGENTIN) injection 2 mg  2 mg IntraMUSCular BID PRN    LORazepam (ATIVAN) injection 2 mg  2 mg IntraMUSCular Q4H PRN    zolpidem (AMBIEN) tablet 10 mg  10 mg Oral QHS PRN    acetaminophen (TYLENOL) tablet 650 mg  650 mg Oral Q4H PRN    ibuprofen (MOTRIN) tablet 400 mg  400 mg Oral Q8H PRN    magnesium hydroxide (MILK OF MAGNESIA) 400 mg/5 mL oral suspension 30 mL  30 mL Oral DAILY PRN    nicotine (NICODERM CQ) 21 mg/24 hr patch 1 Patch  1 Patch TransDERmal DAILY PRN SCHEDULED MEDICATIONS:   Current Facility-Administered Medications   Medication Dose Route Frequency    divalproex ER (DEPAKOTE ER) 24 hour tablet 1,500 mg  1,500 mg Oral QHS    hydrOXYzine pamoate (VISTARIL) capsule 25 mg  25 mg Oral TID          ASSESSMENT & PLAN     DIAGNOSES REQUIRING ACTIVE TREATMENT AND MONITORING: (reviewed/updated 5/5/2018)  Patient Active Hospital Problem List:  Bipolar disorder ([facility] (5/3/2018)    Assessment: hypomania vs substance induced using alcohol and THC. Less Labile, anger outburst, poor impulse control    Plan: I will ct to adjust med-titrate Depakote, add Vistaril for anxiety and agitation     Agitation (5/3/2018)    Assessment: sig since admission- received prn med and utilized seclusion for safety to others [employer]    Plan: prn meds   Marijuana abuse (5/3/2018)    Assessment: regular use, also alcohol abuse    Plan: educate, rehab   Antisocial personality disorder (5/3/2018)    Assessment: several legal issue, angry, entitled and poor impulse control    Plan: limit setting            I will continue to monitor blood levels (Depakote, Tegretol, lithium, clozapine---a drug with a narrow therapeutic index= NTI) and associated labs for drug therapy implemented that require intense monitoring for toxicity as deemed appropriate based on current medication side effects and pharmacodynamically determined drug 1/2 lives. In summary, Mayito Ledbetter, is a 41 y.o.  female who presents with a severe exacerbation of the principal diagnosis of Bipolar disorder ([facility]  Patient's condition is worsening/not improving/not stable  Patient requires continued inpatient hospitalization for further stabilization, safety monitoring and medication management. I will continue to coordinate the provision of individual, milieu, occupational, group, and substance abuse therapies to address target symptoms/diagnoses as deemed appropriate for the individual patient.   A coordinated, multidisplinary treatment team round was conducted with the patient (this team consists of the nurse, psychiatric unit pharmcist,  and writer). Complete current electronic health record for patient has been reviewed today including consultant notes, ancillary staff notes, nurses and psychiatric tech notes. Suicide risk assessment completed and patient deemed to be of low risk for suicide at this time. The following regarding medications was addressed during rounds with patient:   the risks and benefits of the proposed medication. The patient was given the opportunity to ask questions. Informed consent given to the use of the above medications. Will continue to adjust psychiatric and non-psychiatric medications (see above \"medication\" section and orders section for details) as deemed appropriate & based upon diagnoses and response to treatment. I will continue to order blood tests/labs and diagnostic tests as deemed appropriate and review results as they become available (see orders for details and above listed lab/test results). I will order psychiatric records from previous [facility] to further elucidate the nature of patient's psychopathology and review once available. I will gather additional collateral information from friends, family and o/p treatment team to further elucidate the nature of patient's psychopathology and baselline level of psychiatric functioning. I certify that this patient's inpatient psychiatric hospital services furnished since the previous certification were, and continue to be, required for treatment that could reasonably be expected to improve the patient's condition, or for diagnostic study, and that the patient continues to need, on a daily basis, active treatment furnished directly by or requiring the supervision of inpatient psychiatric facility personnel.  In addition, the hospital records show that services furnished were intensive treatment services, admission or related services, or equivalent services.     EXPECTED DISCHARGE DATE/DAY: TBD     DISPOSITION: Home       Signed By:   Kisha Hernandez MD  5/5/2018

## 2018-05-05 NOTE — PROGRESS NOTES
Diet as tolerated. Pt noted to be meal compliant. Hx unremarkable ex for obesity grade III. Ht: 5'2\"  Wt: 229 lb  BMI: 41.88 kg/(m^2) c/w obesity grade III (morbid)  Est energy needs: 1745 kcal, 70 g protein, 2000 mL fluids.   Pt will consume > 75% of meals at follow up

## 2018-05-05 NOTE — BH NOTES
Patient agitated before treatment team this morning. Using profanity and requesting to leave. Attempted to redirect patient and de-escalate. Patient continued to use profanity and addressing staff using obscenities due to \"frustration with being here. \" States, \"I'm not crazy or talking to myself like these other people. \" PRN Ativan IM given for agitation, per order. Will continue to monitor patient and assess needs.

## 2018-05-06 VITALS
RESPIRATION RATE: 20 BRPM | DIASTOLIC BLOOD PRESSURE: 85 MMHG | BODY MASS INDEX: 42.14 KG/M2 | HEIGHT: 62 IN | SYSTOLIC BLOOD PRESSURE: 121 MMHG | OXYGEN SATURATION: 100 % | WEIGHT: 229 LBS | TEMPERATURE: 97.7 F | HEART RATE: 93 BPM

## 2018-05-06 LAB — VALPROATE SERPL-MCNC: 111 UG/ML (ref 50–100)

## 2018-05-06 PROCEDURE — 80164 ASSAY DIPROPYLACETIC ACD TOT: CPT | Performed by: PSYCHIATRY & NEUROLOGY

## 2018-05-06 PROCEDURE — 74011250637 HC RX REV CODE- 250/637

## 2018-05-06 PROCEDURE — 65220000003 HC RM SEMIPRIVATE PSYCH

## 2018-05-06 PROCEDURE — 36415 COLL VENOUS BLD VENIPUNCTURE: CPT | Performed by: PSYCHIATRY & NEUROLOGY

## 2018-05-06 PROCEDURE — 74011250637 HC RX REV CODE- 250/637: Performed by: PSYCHIATRY & NEUROLOGY

## 2018-05-06 RX ADMIN — DIVALPROEX SODIUM 1500 MG: 500 TABLET, FILM COATED, EXTENDED RELEASE ORAL at 21:11

## 2018-05-06 RX ADMIN — OLANZAPINE 5 MG: 5 TABLET, FILM COATED ORAL at 09:03

## 2018-05-06 RX ADMIN — ZOLPIDEM TARTRATE 10 MG: 10 TABLET ORAL at 21:12

## 2018-05-06 RX ADMIN — HYDROXYZINE PAMOATE 25 MG: 25 CAPSULE ORAL at 16:18

## 2018-05-06 RX ADMIN — HYDROXYZINE PAMOATE 50 MG: 25 CAPSULE ORAL at 02:53

## 2018-05-06 RX ADMIN — HYDROXYZINE PAMOATE 25 MG: 25 CAPSULE ORAL at 08:23

## 2018-05-06 RX ADMIN — OLANZAPINE 5 MG: 5 TABLET, FILM COATED ORAL at 18:12

## 2018-05-06 RX ADMIN — HYDROXYZINE PAMOATE 25 MG: 25 CAPSULE ORAL at 11:30

## 2018-05-06 NOTE — BH NOTES
Pt C/O Increased anxiety, pacing in the [address], talking loud and unable to rest or sleep. Ativan and Zyprexa PO was given. 0530 hrs - Pt was in and out of the room thru the night. Pt had an intermittent sleep for about 4 hours. Pt was cooperative with blood drawn for the labs. Q 15 minutes monitoring continued.

## 2018-05-06 NOTE — BH NOTES
Patient complaining of anxiety this morning. Agitated. Requesting to go home. PRN Zyprexa PO given per orders. Will continue to monitor patient and assess needs.

## 2018-05-06 NOTE — PROGRESS NOTES
Problem: Manic Behavior (Adult/Pediatric)  Goal: *STG: Participates in treatment plan  Outcome: Progressing Towards Goal  Pt is visible on the unit. Pt continues to be loud and disruptive at times and requires redirection. Pt can be demanding at time as well demanding medication. Pt denies hallucinations. Will continue to monitor pt q15 minutes for safety and support.
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
## 2018-05-06 NOTE — BH NOTES
Pt focused on getting PRN medications. C/o anxiety and requested for IM at 1740 and received Ativan 2 mg IM. Accepted all scheduled medications. No management problems noted.

## 2018-05-07 PROCEDURE — 74011250637 HC RX REV CODE- 250/637

## 2018-05-07 PROCEDURE — 74011250637 HC RX REV CODE- 250/637: Performed by: PSYCHIATRY & NEUROLOGY

## 2018-05-07 PROCEDURE — 74011250636 HC RX REV CODE- 250/636

## 2018-05-07 PROCEDURE — 74011000250 HC RX REV CODE- 250

## 2018-05-07 RX ORDER — HYDROXYZINE PAMOATE 25 MG/1
50 CAPSULE ORAL 3 TIMES DAILY
Status: DISCONTINUED | OUTPATIENT
Start: 2018-05-07 | End: 2018-05-07 | Stop reason: HOSPADM

## 2018-05-07 RX ADMIN — MAGNESIUM HYDROXIDE 30 ML: 400 SUSPENSION ORAL at 03:12

## 2018-05-07 RX ADMIN — HYDROXYZINE PAMOATE 25 MG: 25 CAPSULE ORAL at 08:04

## 2018-05-07 RX ADMIN — OLANZAPINE 5 MG: 5 TABLET, FILM COATED ORAL at 02:25

## 2018-05-07 RX ADMIN — OLANZAPINE 5 MG: 5 TABLET, FILM COATED ORAL at 08:46

## 2018-05-07 RX ADMIN — HYDROXYZINE PAMOATE 50 MG: 25 CAPSULE ORAL at 11:50

## 2018-05-07 RX ADMIN — WATER 20 MG: 1 INJECTION INTRAMUSCULAR; INTRAVENOUS; SUBCUTANEOUS at 05:00
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
## 2018-05-07 NOTE — DISCHARGE INSTRUCTIONS
DISCHARGE SUMMARY from Nurse    PATIENT INSTRUCTIONS:    What to do at Home:    Recommended activity: Activity as tolerated,     *  Please give a list of your current medications to your Primary Care Provider. *  Please update this list whenever your medications are discontinued, doses are      changed, or new medications (including over-the-counter products) are added. *  Please carry medication information at all times in case of emergency situations. These are general instructions for a healthy lifestyle:    No smoking/ No tobacco products/ Avoid exposure to second hand smoke  Surgeon General's Warning:  Quitting smoking now greatly reduces serious risk to your health. Obesity, smoking, and sedentary lifestyle greatly increases your risk for illness    A healthy diet, regular physical exercise & weight monitoring are important for maintaining a healthy lifestyle    You may be retaining fluid if you have a history of heart failure or if you experience any of the following symptoms:  Weight gain of 3 pounds or more overnight or 5 pounds in a week, increased swelling in our hands or feet or shortness of breath while lying flat in bed. Please call your doctor as soon as you notice any of these symptoms; do not wait until your next office visit. Recognize signs and symptoms of STROKE:    F-face looks uneven    A-arms unable to move or move unevenly    S-speech slurred or non-existent    T-time-call 911 as soon as signs and symptoms begin-DO NOT go       Back to bed or wait to see if you get better-TIME IS BRAIN. Warning Signs of HEART ATTACK     Call 911 if you have these symptoms:   Chest discomfort. Most heart attacks involve discomfort in the center of the chest that lasts more than a few minutes, or that goes away and comes back. It can feel like uncomfortable pressure, squeezing, fullness, or pain.  Discomfort in other areas of the upper body.  Symptoms can include pain or discomfort in one or both arms, the back, neck, jaw, or stomach.  Shortness of breath with or without chest discomfort.  Other signs may include breaking out in a cold sweat, nausea, or lightheadedness. Don't wait more than five minutes to call 911 - MINUTES MATTER! Fast action can save your life. Calling 911 is almost always the fastest way to get lifesaving treatment. Emergency Medical Services staff can begin treatment when they arrive -- up to an hour sooner than if someone gets to the hospital by car. The discharge information has been reviewed with the patient. The patient verbalized understanding. Discharge medications reviewed with the patient and appropriate educational materials and side effects teaching were provided. If I feel that I can not keep these promises and I am at risk of hurting myself or others,I will call the crisis office and speak with a crisis worker who will assist me during my crisis.     [address] [place] [address] [phone]  ___________________________________________________________________________________________________________________________________

## 2018-05-07 NOTE — BH NOTES
Pt is alert and oriented x person, place and time. Pt denies any SI/HI or AV hallucinations. Pt denies any depression. Pt plans to return home. Discharge information reviewed with patient. Pt verbalizes understanding. Pt's belongings/valuables returned. Pt to be transported home by mother.

## 2018-05-07 NOTE — BH NOTES
Patient is loud. Labile. Fussing and cussing on the unit. Patient given Zyprexa 5mg po. Requires redirection. Focused on discharge. Will continue to monitor patient and assess needs.
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
## 2018-05-15 NOTE — DISCHARGE SUMMARY
PSYCHIATRIC DISCHARGE SUMMARY         IDENTIFICATION:    Patient Name  Jason Parsons   Date of Birth 1980   [facility] [id number]   Medical Record Number  [id number]      Age  41 y.o. PCP Dionne Cruz MD   Admit date:  5/1/2018    Discharge date: 5/7/2018   Room Number  994/57  @ [facility]   Date of Service  5/7/2018            TYPE OF DISCHARGE: REGULAR               CONDITION AT DISCHARGE: fair       PROVISIONAL & DISCHARGE DIAGNOSES:    Problem List  Date Reviewed: 12/21/2015          Codes Class    * (Principal)Bipolar disorder ([facility] ICD-10-CM: F31.9  ICD-9-CM: 296.80         Agitation ICD-10-CM: R45.1  ICD-9-CM: 307.9         Marijuana abuse ICD-10-CM: F12.10  ICD-9-CM: 305.20         Antisocial personality disorder ICD-10-CM: F60.2  ICD-9-CM: 301.7         Psychosis ICD-10-CM: F29  ICD-9-CM: 298.9         Affective psychosis, bipolar ([facility] [address] ICD-10-CM: F31.9  ICD-9-CM: 296.80         Pelvic pain ICD-10-CM: R10.2  ICD-9-CM: [id number]         Menorrhagia ICD-10-CM: N92.0  ICD-9-CM: 626.2         Dysmenorrhea ICD-10-CM: N94.6  ICD-9-CM: 260. 3         Dyspareunia ICD-10-CM: [id number]  ICD-9-CM: 625.0         Myoma ICD-10-CM: D21.9  ICD-9-CM: 215.9         Headache(784.0) ICD-10-CM: R51  ICD-9-CM: 025. 0         Blurred vision ICD-10-CM: H53.8  ICD-9-CM: 368.8         Accelerated hypertension ICD-10-CM: I10  ICD-9-CM: 401.0               Active Hospital Problems    *Bipolar disorder ([facility]      Agitation      Marijuana abuse      Antisocial personality disorder        DISCHARGE DIAGNOSIS:   Axis I:  SEE ABOVE  Axis II: SEE ABOVE  Axis III: SEE ABOVE  Axis IV:  lack of structure  Axis V:  30 on admission, 55 on discharge 60(baseline)       CC & HISTORY OF PRESENT ILLNESS:  The patient, Jason Parsons, is a 41 y.o.   BLACK OR  female with a past psychiatric history significant for bipolar vs adjustment d/o, who presents at this time with complaints of (and/or evidence of) the following emotional symptoms: homicidal thoughts/threats. Additional symptomatology include irritable and labile mood and agitation. Pt received prn medication and utilized seclusion due to aggressive behavior. Pt reported to have been threatening her stepdaughter and her mother. She is refusing to speak with the tx team and appear sedated due to medication effect. The above symptoms have been present for few days. These symptoms are of severe severity. These symptoms are intermittent/ fleeting in nature. The patient's condition has been precipitated by and psychosocial stressors ( ). Patient's condition made worse by continued illicit drug use and treatment noncompliance. UDS: +MJ ; BAL=0.        SOCIAL HISTORY:    Social History     Social History    Marital status: SINGLE     Spouse name: N/A    Number of children: N/A    Years of education: N/A     Occupational History    Not on file. Social History Main Topics    Smoking status: Former Smoker     Quit date: 3/27/2012    Smokeless tobacco: Never Used      Comment: two times weekly    Alcohol use No      Comment: 4/month    Drug use: Yes     Special: Marijuana      Comment: \"i have 5 blunts today. \" pt refused to answer any PMH questions.  Sexual activity: Yes     Partners: Male     Birth control/ protection: Surgical      Comment: Tubal Ligation     Other Topics Concern    Not on file     Social History Narrative    No narrative on file      FAMILY HISTORY:   Family History   Problem Relation Age of Onset    Diabetes Maternal Grandmother     Stroke Maternal Grandmother              HOSPITALIZATION COURSE:    Pat Burris was admitted to the inpatient psychiatric unit [facility] for acute psychiatric stabilization in regards to symptomatology as described in the HPI above. The differential diagnosis at time of admission included: bipolar dis vs. schizoaffective vs. Schizophrenia.   While on the unit Pat Burris was involved in individual, group, occupational and milieu therapy. Psychiatric medications were adjusted during this hospitalization including Depakote. Jimmy Orona demonstrated a slow, but progressive improvement in overall condition. Much of patient's depression appeared to be related to situational stressors, effects of drugs of abuse, and psychological factors. Please see individual progress notes for more specific details regarding patient's hospitalization course. At time of discharge, Jimmy Orona is without significant problems of depression psychosis  freddie. Patient free of suicidal and homicidal ideations (appears to be at very low risk of suicide or homicide) and reports many positive predictive factors in terms of not attempting suicide or homicide. Overall presentation at time of discharge is most consistent with the diagnosis of bipolar disorder and antisocial personalitty disorder Patient with request for discharge today. There are no grounds to seek a TDO. Patient has maximized benefit to be derived from acute inpatient psychiatric treatment. All members of the treatment team concur with each other in regards to plans for discharge today per patient's request.  Patient and family are aware and in agreement with discharge and discharge plan. Per my last note: pt denies SI/HI/AVH. Mood is irritable and angry at being in the hospital but denies freddie or psychosis. Denies harm to others         LABS AND IMAGAING:    Labs Reviewed   CBC WITH AUTOMATED DIFF - Abnormal; Notable for the following:        Result Value    IMMATURE GRANULOCYTES 1 (*)     ABS. IMM.  GRANS. 0.1 (*)     All other components within normal limits   METABOLIC PANEL, BASIC - Abnormal; Notable for the following:     Glucose 111 (*)     BUN/Creatinine ratio 11 (*)     All other components within normal limits   DRUG SCREEN, URINE - Abnormal; Notable for the following:     THC (TH-CANNABINOL) POSITIVE (*)     All other components within normal limits   URINALYSIS W/ REFLEX CULTURE - Abnormal; Notable for the following:     Appearance TURBID (*)     Ketone 40 (*)     Leukocyte Esterase MODERATE (*)     Epithelial cells MANY (*)     Bacteria 1+ (*)     UA:UC IF INDICATED URINE CULTURE ORDERED (*)     All other components within normal limits   VALPROIC ACID - Abnormal; Notable for the following:     Valproic acid 111 (*)     All other components within normal limits   CULTURE, URINE   ETHYL ALCOHOL   SAMPLES BEING HELD   HCG URINE, QL. - POC     Lab Results   Component Value Date/Time    Valproic acid 111 (H) 05/06/2018 04:39 AM     Admission on 05/01/2018, Discharged on 05/07/2018   Component Date Value Ref Range Status    WBC 05/01/2018 10.2  3.6 - 11.0 K/uL Final    RBC 05/01/2018 4.24  3.80 - 5.20 M/uL Final    HGB 05/01/2018 13.2  11.5 - 16.0 g/dL Final    HCT 05/01/2018 38.7  35.0 - 47.0 % Final    MCV 05/01/2018 91.3  80.0 - 99.0 FL Final    MCH 05/01/2018 31.1  26.0 - 34.0 PG Final    MCHC 05/01/2018 34.1  30.0 - 36.5 g/dL Final    RDW 05/01/2018 12.4  11.5 - 14.5 % Final    PLATELET 84/14/0625 316  150 - 400 K/uL Final    MPV 05/01/2018 10.5  8.9 - 12.9 FL Final    NRBC 05/01/2018 0.0  0  WBC Final    ABSOLUTE NRBC 05/01/2018 0.00  0.00 - 0.01 K/uL Final    NEUTROPHILS 05/01/2018 74  32 - 75 % Final    LYMPHOCYTES 05/01/2018 19  12 - 49 % Final    MONOCYTES 05/01/2018 6  5 - 13 % Final    EOSINOPHILS 05/01/2018 0  0 - 7 % Final    BASOPHILS 05/01/2018 0  0 - 1 % Final    IMMATURE GRANULOCYTES 05/01/2018 1* 0.0 - 0.5 % Final    ABS. NEUTROPHILS 05/01/2018 7.6  1.8 - 8.0 K/UL Final    ABS. LYMPHOCYTES 05/01/2018 2.0  0.8 - 3.5 K/UL Final    ABS. MONOCYTES 05/01/2018 0.6  0.0 - 1.0 K/UL Final    ABS. EOSINOPHILS 05/01/2018 0.0  0.0 - 0.4 K/UL Final    ABS. BASOPHILS 05/01/2018 0.0  0.0 - 0.1 K/UL Final    ABS. IMM.  GRANS. 05/01/2018 0.1* 0.00 - 0.04 K/UL Final    DF 05/01/2018 AUTOMATED    Final    Sodium 05/01/2018 140  136 - 145 mmol/L Final    Potassium 05/01/2018 3.7  3.5 - 5.1 mmol/L Final    Chloride 05/01/2018 103  97 - 108 mmol/L Final    CO2 05/01/2018 22  21 - 32 mmol/L Final    Anion gap 05/01/2018 15  5 - 15 mmol/L Final    Glucose 05/01/2018 111* 65 - 100 mg/dL Final    BUN 05/01/2018 10  6 - 20 MG/DL Final    Creatinine 05/01/2018 0.87  0.55 - 1.02 MG/DL Final    BUN/Creatinine ratio 05/01/2018 11* 12 - 20   Final    GFR est AA 05/01/2018 >60  >60 ml/min/1.73m2 Final    GFR est non-AA 05/01/2018 >60  >60 ml/min/1.73m2 Final    Calcium 05/01/2018 9.7  8.5 - 10.1 MG/DL Final    ALCOHOL(ETHYL),SERUM 05/01/2018 <10  <10 MG/DL Final    AMPHETAMINES 05/01/2018 NEGATIVE   NEG   Final    BARBITURATES 05/01/2018 NEGATIVE   NEG   Final    BENZODIAZEPINES 05/01/2018 NEGATIVE   NEG   Final    COCAINE 05/01/2018 NEGATIVE   NEG   Final    METHADONE 05/01/2018 NEGATIVE   NEG   Final    OPIATES 05/01/2018 NEGATIVE   NEG   Final    PCP(PHENCYCLIDINE) 05/01/2018 NEGATIVE   NEG   Final    THC (TH-CANNABINOL) 05/01/2018 POSITIVE* NEG   Final    Drug screen comment 05/01/2018 (NOTE)   Final    Color 05/01/2018 YELLOW/STRAW    Final    Appearance 05/01/2018 TURBID* CLEAR   Final    Specific gravity 05/01/2018 1.010  1.003 - 1.030   Final    pH (UA) 05/01/2018 6.0  5.0 - 8.0   Final    Protein 05/01/2018 NEGATIVE   NEG mg/dL Final    Glucose 05/01/2018 NEGATIVE   NEG mg/dL Final    Ketone 05/01/2018 40* NEG mg/dL Final    Bilirubin 05/01/2018 NEGATIVE   NEG   Final    Blood 05/01/2018 NEGATIVE   NEG   Final    Urobilinogen 05/01/2018 0.2  0.2 - 1.0 EU/dL Final    Nitrites 05/01/2018 NEGATIVE   NEG   Final    Leukocyte Esterase 05/01/2018 MODERATE* NEG   Final    WBC 05/01/2018 5-10  0 - 4 /hpf Final    RBC 05/01/2018 0-5  0 - 5 /hpf Final    Epithelial cells 05/01/2018 MANY* FEW /lpf Final    Bacteria 05/01/2018 1+* NEG /hpf Final    UA:UC IF INDICATED 05/01/2018 URINE CULTURE ORDERED* CNI   Final    Pregnancy test,urine (POC) 05/01/2018 NEGATIVE   NEG   Final    Special Requests: 05/01/2018     Final                    Value:NO SPECIAL REQUESTS  Reflexed from [id number]      Bedrock Count 05/01/2018     Final                    Value:>100,000  COLONIES/mL      Culture result: 05/01/2018 MIXED UROGENITAL SHEBA ISOLATED    Final    Valproic acid 05/06/2018 111* 50 - 100 ug/ml Final     No results found. DISPOSITION:      Home. Patient to f/u with   drug/etoh rehabilitation, psychiatric, and psychotherapy appointments. Patient is to f/u with internist as directed. Patient should have a depakote level and associated labs checked within the next 1-2 weeks by patient's o/p psychiatrist/internist.               FOLLOW-UP CARE:    Activity as tolerated  Low fat, Low cholesterol  Wound Care: none needed. Follow-up Information     Follow up With Details Comments [address]   Same day access appointment 5/8/18 @ 7:30am-3:00pm for intake assessment  [facility] MARIELLE [place]. [place], [address] [place] [address]  [phone]                 PROGNOSIS:   Guarded / Poor---- based on nature of patient's pathology/ies and treatment compliance issues. Prognosis is greatly dependent upon patient's ability to remain sober and to follow up with drug/etoh rehabilitation and psychiatric/psychotherapy appointments as well as to comply with psychiatric medications as prescribed. DISCHARGE MEDICATIONS:   Informed consent given for the use of following psychotropic medications: There are no discharge medications for this patient. A coordinated, multidisplinary treatment team round was conducted with Sherrill Horn---this is done daily here at [facility]. This team consists of the nurse, psychiatric unit pharmcist,  and writer. I have spent greater than 35 minutes on discharge work.     Signed:  Jatin Marrufo MD  5/15/2018

## 2018-05-17 ENCOUNTER — HOSPITAL ENCOUNTER (EMERGENCY)
Age: 38
Discharge: HOME | End: 2018-05-17
Attending: EMERGENCY MEDICINE
Payer: MEDICAID

## 2018-05-17 VITALS
RESPIRATION RATE: 18 BRPM | DIASTOLIC BLOOD PRESSURE: 90 MMHG | OXYGEN SATURATION: 98 % | SYSTOLIC BLOOD PRESSURE: 111 MMHG | HEART RATE: 78 BPM | BODY MASS INDEX: 44.16 KG/M2 | WEIGHT: 240 LBS | TEMPERATURE: 98.1 F | HEIGHT: 62 IN

## 2018-05-17 DIAGNOSIS — R53.1 GENERALIZED WEAKNESS: ICD-10-CM

## 2018-05-17 DIAGNOSIS — K59.1 FUNCTIONAL DIARRHEA: ICD-10-CM

## 2018-05-17 DIAGNOSIS — E86.0 DEHYDRATION: ICD-10-CM

## 2018-05-17 DIAGNOSIS — I89.0 LYMPHEDEMA OF BOTH LOWER EXTREMITIES: Primary | ICD-10-CM

## 2018-05-17 LAB
ALBUMIN SERPL-MCNC: 3 G/DL (ref 3.5–5)
ALBUMIN/GLOB SERPL: 0.9 {RATIO} (ref 1.1–2.2)
ALP SERPL-CCNC: 48 U/L (ref 45–117)
ALT SERPL-CCNC: 15 U/L (ref 12–78)
ANION GAP SERPL CALC-SCNC: 7 MMOL/L (ref 5–15)
AST SERPL-CCNC: 10 U/L (ref 15–37)
BILIRUB SERPL-MCNC: 0.1 MG/DL (ref 0.2–1)
BUN SERPL-MCNC: 12 MG/DL (ref 6–20)
BUN/CREAT SERPL: 13 (ref 12–20)
CALCIUM SERPL-MCNC: 8.3 MG/DL (ref 8.5–10.1)
CHLORIDE SERPL-SCNC: 108 MMOL/L (ref 97–108)
CO2 SERPL-SCNC: 29 MMOL/L (ref 21–32)
CREAT SERPL-MCNC: 0.91 MG/DL (ref 0.55–1.02)
ERYTHROCYTE [DISTWIDTH] IN BLOOD BY AUTOMATED COUNT: 12.1 % (ref 11.5–14.5)
GLOBULIN SER CALC-MCNC: 3.2 G/DL (ref 2–4)
GLUCOSE SERPL-MCNC: 93 MG/DL (ref 65–100)
HCT VFR BLD AUTO: 36.5 % (ref 35–47)
HGB BLD-MCNC: 12 G/DL (ref 11.5–16)
MCH RBC QN AUTO: 30.6 PG (ref 26–34)
MCHC RBC AUTO-ENTMCNC: 32.9 G/DL (ref 30–36.5)
MCV RBC AUTO: 93.1 FL (ref 80–99)
NRBC # BLD: 0 K/UL (ref 0–0.01)
NRBC BLD-RTO: 0 PER 100 WBC
PLATELET # BLD AUTO: 161 K/UL (ref 150–400)
PMV BLD AUTO: 10.8 FL (ref 8.9–12.9)
POTASSIUM SERPL-SCNC: 3.9 MMOL/L (ref 3.5–5.1)
PROT SERPL-MCNC: 6.2 G/DL (ref 6.4–8.2)
RBC # BLD AUTO: 3.92 M/UL (ref 3.8–5.2)
SODIUM SERPL-SCNC: 144 MMOL/L (ref 136–145)
WBC # BLD AUTO: 6.6 K/UL (ref 3.6–11)

## 2018-05-17 PROCEDURE — 96360 HYDRATION IV INFUSION INIT: CPT

## 2018-05-17 PROCEDURE — 74011250636 HC RX REV CODE- 250/636: Performed by: EMERGENCY MEDICINE

## 2018-05-17 PROCEDURE — 36415 COLL VENOUS BLD VENIPUNCTURE: CPT | Performed by: EMERGENCY MEDICINE

## 2018-05-17 PROCEDURE — 99282 EMERGENCY DEPT VISIT SF MDM: CPT

## 2018-05-17 PROCEDURE — 85027 COMPLETE CBC AUTOMATED: CPT | Performed by: EMERGENCY MEDICINE

## 2018-05-17 PROCEDURE — 80053 COMPREHEN METABOLIC PANEL: CPT | Performed by: EMERGENCY MEDICINE

## 2018-05-17 RX ORDER — FUROSEMIDE 20 MG/1
20 TABLET ORAL DAILY
Qty: 4 TAB | Refills: 0 | Status: SHIPPED | OUTPATIENT
Start: 2018-05-17 | End: 2018-05-21

## 2018-05-17 RX ADMIN — SODIUM CHLORIDE 1000 ML: 900 INJECTION, SOLUTION INTRAVENOUS at 22:21

## 2018-05-18 NOTE — DISCHARGE INSTRUCTIONS
Lymphedema: Care Instructions  Your Care Instructions    Lymphedema is fluid that builds up in the arms or legs. It is often caused by surgery to remove lymph nodes during cancer treatment, especially breast cancer surgery, which can cause fluid to build up in the arm. It can happen after radiation treatment to an area that involves lymph nodes. It also can be caused by a fractured bone or surgery to fix a fracture. And some medicines also can cause lymphedema. Some people get it for unknown reasons. Normally, lymph nodes trap bacteria and other substances as fluid flows through them. Then, the white cells in the body's defense, or immune, system can destroy the substances. But if there are few or no lymph nodes-or if the lymph system in an arm or leg has been damaged-fluid can build up in the affected arm or leg. You can take simple steps at home to help treat or prevent fluid buildup. Treatment may include raising the arm or leg to let gravity drain the fluid. You also can wear compression stockings or sleeves. Follow-up care is a key part of your treatment and safety. Be sure to make and go to all appointments, and call your doctor if you are having problems. It's also a good idea to know your test results and keep a list of the medicines you take. How can you care for yourself at home? · Wear a compression stocking or sleeve as your doctor suggests. It can help keep fluid from pooling in an arm or leg. Wear it during air travel. · Prop up the swollen arm or leg on a pillow anytime you sit or lie down. Try to keep it above the level of your heart. This will help reduce swelling. · Avoid crossing your legs if your legs are swollen. · Get some exercise on most days of the week. Increase the intensity of exercise slowly. Water aerobics can help reduce swelling by helping fluid move around. Wear your compression stocking or sleeve during exercise, but not during water exercise.   · See a physical therapist. He or she can teach you how to do self-massage to help fluid move around. You also can learn what activities would be best for you. · Keep your feet clean and wear clean socks or stockings every day. Check your feet often for signs of infection, such as redness or heat. Do not walk barefoot. · If you have had lymph nodes removed from under your arm:  ¨ Do not have blood drawn from the arm on the side of the lymph node surgery. ¨ Do not allow a blood pressure cuff to be placed on that arm. If you are in the hospital, make sure your nurse and other hospital staff know of your condition. ¨ Wear gloves when gardening or doing other activities that may lead to cuts on your fingers or hands. · If you have had lymph nodes removed from your groin:  ¨ Bathe your feet daily in lukewarm, not hot, water. Use a mild soap that has a moisturizer, or use a moisturizer separately. ¨ Check your feet for blisters or cuts. ¨ Wear comfortable and supportive shoes that fit properly. ¨ Wear the correct size of panty hose and stockings. Avoid garters or knee-high or thigh-high stockings. · Ask your doctor how to treat any cuts, scratches, insect bites, or other injuries that may occur. · Use sunscreen and insect repellent when outdoors to protect your skin from sunburn and insect bites. · Wear medical alert jewelry that says you have lymphedema. You can buy these at most drugstores and on the Internet. When should you call for help? Call your doctor now or seek immediate medical care if:  ? · You have signs of infection, such as:  ¨ Increased pain, swelling, warmth, or redness. ¨ Red streaks leading from the area. ¨ Pus draining from the area. ¨ A fever. ? Watch closely for changes in your health, and be sure to contact your doctor if:  ? · You have new or worse symptoms from lymphedema. ? · You do not get better as expected. Where can you learn more?   Go to http://jesus-janet.info/. Enter V398 in the search box to learn more about \"Lymphedema: Care Instructions. \"  Current as of: May 12, 2017  Content Version: 11.4  © 4982-5506 [employer], [employer]. Care instructions adapted under license by [employer] (which disclaims liability or warranty for this information). If you have questions about a medical condition or this instruction, always ask your healthcare professional. [place] [address] any warranty or liability for your use of this information.

## 2018-05-18 NOTE — ED NOTES
Dr Amor Thrasher at bedside reviewing patient's discharge instructions and reviewing medications. Patient ambulatory home . Patient in no apparent distress.

## 2018-05-18 NOTE — ED NOTES
Pt arrived to ED with c/o L foot pain and swelling that started earlier. Pt also c/o neck pain. Pt is alert and orientated X 4; skin is intact; lungs are clear; pt breathes well on room air; Pt is in no acute distress. Will continue to monitor. See nursing assessment. Safety precautions in place; call light within reach. Emergency Department Nursing Plan of Care       The Nursing Plan of Care is developed from the Nursing assessment and Emergency Department Attending provider initial evaluation. The plan of care may be reviewed in the ED Provider note.     The Plan of Care was developed with the following considerations:   Patient / Family readiness to learn indicated by:verbalized understanding  Persons(s) to be included in education: patient  Barriers to Learning/Limitations:Joelle Padron, RN    5/17/2018   10:02 PM
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
## 2018-08-13 ENCOUNTER — HOSPITAL ENCOUNTER (EMERGENCY)
Age: 38
Discharge: HOME | End: 2018-08-13
Attending: EMERGENCY MEDICINE
Payer: MEDICAID

## 2018-08-13 VITALS
RESPIRATION RATE: 17 BRPM | HEART RATE: 92 BPM | HEIGHT: 62 IN | DIASTOLIC BLOOD PRESSURE: 70 MMHG | TEMPERATURE: 98.1 F | OXYGEN SATURATION: 99 % | SYSTOLIC BLOOD PRESSURE: 116 MMHG

## 2018-08-13 DIAGNOSIS — M79.10 MYALGIA: ICD-10-CM
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
## 2018-08-13 DIAGNOSIS — R51.9 ACUTE NONINTRACTABLE HEADACHE, UNSPECIFIED HEADACHE TYPE: ICD-10-CM

## 2018-08-13 PROCEDURE — 99283 EMERGENCY DEPT VISIT LOW MDM: CPT

## 2018-08-13 RX ORDER — BUTALBITAL, ACETAMINOPHEN AND CAFFEINE 300; 40; 50 MG/1; MG/1; MG/1
1 CAPSULE ORAL
Qty: 6 CAP | Refills: 0 | Status: SHIPPED | OUTPATIENT
Start: 2018-08-13 | End: 2018-12-03
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
## 2018-08-13 NOTE — ED NOTES
Emergency Department Nursing Plan of Care       The Nursing Plan of Care is developed from the Nursing assessment and Emergency Department Attending provider initial evaluation. The plan of care may be reviewed in the ED Provider note. The Plan of Care was developed with the following considerations:   Patient / Family readiness to learn indicated by:verbalized understanding  Persons(s) to be included in education: patient  Barriers to Learning/Limitations:No    Signed     Marisol Diggs    8/13/2018   10:10 AM      Patient is very demanding and threatening to staff. States she only takes [place] medications. \" \"Advil don't work for me. \" When informed by provider that her neuro assessment was WDL she states, \"you better document that cause if something happens to me its on you. \"
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
## 2018-08-13 NOTE — ED NOTES
Patient (s)   given copy of dc instructions and 1 script(s). Patient(s)   verbalized understanding of instructions and script (s). Patient given a current medication reconciliation form and verbalized understanding of their medications. Patient (s)  verbalized understanding of the importance of discussing medications with  his or her physician or clinic when they follow up. Patient alert and oriented and in no acute distress. Pt verbalizes pain scale of 9 out of 10. Patient discharged home ambulatory with self.
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
## 2018-12-03 ENCOUNTER — HOSPITAL ENCOUNTER (EMERGENCY)
Age: 38
Discharge: HOME | End: 2018-12-03
Attending: EMERGENCY MEDICINE
Payer: SELF-PAY

## 2018-12-03 VITALS
HEIGHT: 62 IN | OXYGEN SATURATION: 97 % | BODY MASS INDEX: 40.48 KG/M2 | HEART RATE: 93 BPM | RESPIRATION RATE: 16 BRPM | WEIGHT: 220 LBS | DIASTOLIC BLOOD PRESSURE: 68 MMHG | TEMPERATURE: 98 F | SYSTOLIC BLOOD PRESSURE: 114 MMHG

## 2018-12-03 DIAGNOSIS — G43.009 MIGRAINE WITHOUT AURA AND WITHOUT STATUS MIGRAINOSUS, NOT INTRACTABLE: ICD-10-CM

## 2018-12-03 DIAGNOSIS — G93.2 IIH (IDIOPATHIC INTRACRANIAL HYPERTENSION): Primary | ICD-10-CM

## 2018-12-03 PROCEDURE — 99283 EMERGENCY DEPT VISIT LOW MDM: CPT

## 2018-12-03 PROCEDURE — 74011250637 HC RX REV CODE- 250/637: Performed by: EMERGENCY MEDICINE

## 2018-12-03 RX ORDER — BUTALBITAL, ACETAMINOPHEN AND CAFFEINE 50; 325; 40 MG/1; MG/1; MG/1
1 TABLET ORAL
Qty: 20 TAB | Refills: 0 | Status: SHIPPED | OUTPATIENT
Start: 2018-12-03 | End: 2022-01-20

## 2018-12-03 RX ORDER — ACETAZOLAMIDE 500 MG/1
CAPSULE, EXTENDED RELEASE ORAL
Refills: 11 | COMMUNITY
Start: 2018-11-30

## 2018-12-03 RX ORDER — BUTALBITAL, ACETAMINOPHEN AND CAFFEINE 50; 325; 40 MG/1; MG/1; MG/1
2 TABLET ORAL
Status: COMPLETED | OUTPATIENT
Start: 2018-12-03 | End: 2018-12-03

## 2018-12-03 RX ADMIN — BUTALBITAL, ACETAMINOPHEN, AND CAFFEINE 2 TABLET: 50; 325; 40 TABLET ORAL at 14:44

## 2018-12-03 NOTE — ED NOTES
Pt given printed discharge instructions and 1 script(s). Pt verbalized understanding of instructions and script(s). Pt verbalized importance of following up with neuro. Pt alert and oriented, in no acute distress, ambulatory with family.

## 2018-12-03 NOTE — ED NOTES
Assumed care of patient from triage. Patient alert and oriented x4. Patient reports headache x1 wk blurred vision. States \"I always have blurred vision. I have fluid on my brain and an eye problem\". Reports she is unable to get neurologist appointment until Jan. Denies any other complaints at this time. Emergency Department Nursing Plan of Care The Nursing Plan of Care is developed from the Nursing assessment and Emergency Department Attending provider initial evaluation. The plan of care may be reviewed in the ED Provider note. The Plan of Care was developed with the following considerations:  
Patient / Family readiness to learn indicated by:verbalized understanding Persons(s) to be included in education: patient Barriers to Learning/Limitations:No 
 
Signed Selam Donovan RN   
12/3/2018   2:33 PM

## 2018-12-03 NOTE — ED PROVIDER NOTES
EMERGENCY DEPARTMENT HISTORY AND PHYSICAL EXAM 
 
 
Date: 12/3/2018 Patient Name: Alicia Medley History of Presenting Illness Chief Complaint Patient presents with  
 Headache History Provided By: Patient HPI: Alicia Medley, 41 y.o. female with PMHx significant for migraines, IIH, HTN presents ambulatory to the ED with cc of a persistent, gradually worsening, aching L frontal HA with associated intermittent blurry vision x 1 week. She reports a hx of migraines, noting she was followed by a Neurologist at [facility], but has not been able to FU with him in 3 months because she lost her insurance. Pt states that she currently has medical gap insurance but her neurologist doesn't accept it, noting that she will not have regular insurance again for ~ 1 month. She reports that she is currently taking acetazolamide for her IIH, which she reports she was noncompliant with for ~ 2 months but recently restarted it 4 days ago. Pt notes that her pain is exacerbated with loud noises such as screaming. She states she has been taking Advil and Nyquil for her pain w/o significant relief. Pt specifically denies any fever, chills, SOB, CP, HA, N/V/D, abdominal pain, dysuria, hematuria, or rash. There are no other complaints, changes, or physical findings at this time. Social Hx: + EtOH (occasional); Former Smoker; + Illicit Drugs (marijuana) Family Hx: DM, CVA Surgical Hx:  (x2), partial hysterectomy, tubal ligation, LP 
 
PCP: Sindi Bustamante MD  
Neurology: Otis Medina III, MD 
 
Current Outpatient Medications Medication Sig Dispense Refill  acetaZOLAMIDE SR (DIAMOX) 500 mg capsule TAKE 1 CAPSULE BY MOUTH TWICE A DAY  11  
 butalbital-acetaminophen-caffeine (FIORICET, ESGIC) -40 mg per tablet Take 1 Tab by mouth every six (6) hours as needed for Headache. 20 Tab 0 Past History Past Medical History: 
Past Medical History:  
Diagnosis Date  Anxiety  Hypertension  Other ill-defined conditions(799.89) Hydrocephalus - mild  Psychiatric disorder Anxiety Past Surgical History: 
Past Surgical History:  
Procedure Laterality Date  HX  SECTION  ,03  
 x2  HX HEENT   LP to relieve right eye pressure  HX HYSTERECTOMY Partial  
 HX LAPAROSCOPIC SUPRACERVICAL HYSTERECTOMY  14  HX OTHER SURGICAL  2013 [employer]  HX TUBAL LIGATION Family History: 
Family History Problem Relation Age of Onset  Diabetes Maternal Grandmother  Stroke Maternal Grandmother Social History: 
Social History Tobacco Use  Smoking status: Former Smoker Last attempt to quit: 3/27/2012 Years since quittin.6  Smokeless tobacco: Never Used  Tobacco comment: two times weekly Substance Use Topics  Alcohol use: Yes Comment: 4/month  Drug use: Yes Types: Marijuana Allergies: 
No Known Allergies Review of Systems Review of Systems Constitutional: Negative for chills and fever. HENT: Negative for congestion, rhinorrhea, sneezing and sore throat. Eyes: Positive for visual disturbance. Negative for photophobia. Respiratory: Negative for shortness of breath. Cardiovascular: Negative for chest pain. Gastrointestinal: Negative for abdominal pain, diarrhea, nausea and vomiting. Genitourinary: Negative for dysuria and hematuria. Musculoskeletal: Negative for back pain, myalgias and neck stiffness. Skin: Negative for rash. Neurological: Positive for headaches. Negative for dizziness, weakness and light-headedness. All other systems reviewed and are negative. Physical Exam  
Physical Exam  
Constitutional: She is oriented to person, place, and time. She appears well-developed and well-nourished. HENT:  
Head: Normocephalic and atraumatic.   
Mouth/Throat: Oropharynx is clear and moist.  
Eyes: Conjunctivae and EOM are normal.  
 Neck: Normal range of motion and full passive range of motion without pain. Neck supple. Cardiovascular: Normal rate, regular rhythm, S1 normal, S2 normal, normal heart sounds, intact distal pulses and normal pulses. No murmur heard. Pulmonary/Chest: Effort normal and breath sounds normal. No respiratory distress. She has no wheezes. Abdominal: Soft. Normal appearance and bowel sounds are normal. She exhibits no distension. There is no tenderness. There is no rebound. Musculoskeletal: Normal range of motion. Neurological: She is alert and oriented to person, place, and time. She has normal strength. Skin: Skin is warm, dry and intact. No rash noted. Psychiatric: She has a normal mood and affect. Her speech is normal and behavior is normal. Judgment and thought content normal.  
Nursing note and vitals reviewed. Medical Decision Making I am the first provider for this patient. I reviewed the vital signs, available nursing notes, past medical history, past surgical history, family history and social history. Vital Signs-Reviewed the patient's vital signs. Patient Vitals for the past 12 hrs: 
 Temp Pulse Resp BP SpO2  
12/03/18 1420 98 °F (36.7 °C) 93 16 114/68 97 % Pulse Oximetry Analysis - 97% on RA Cardiac Monitor:  
Rate: 93 bpm 
Rhythm: Normal Sinus Rhythm Records Reviewed: Nursing Notes and Old Medical Records Provider Notes (Medical Decision Making): DDx: tension HA, migraine HA, IIH, medication noncompliance ED Course:  
Initial assessment performed. The patients presenting problems have been discussed, and they are in agreement with the care plan formulated and outlined with them. I have encouraged them to ask questions as they arise throughout their visit. 2:37 PM 
Reviewed records from [facility]. Pt followed in Neurology clinic regularly. Was on Diamox as well as Topamax at one point and per last visit is supposed to currently be on just Diamox. SIGN OUT: 
3:00 PM 
Patient's presentation, labs/imaging and plan of care was reviewed with Zeferino Crowley MD as part of sign out. They will continue care for pt through pain control as part of the plan discussed with the patient. Zeferino Crowley MD's assistance in completion of this plan is greatly appreciated but it should be noted that I will be the provider of record for this patient. Chacha Lockwood MD 
 
3:42 PM 
Pt has been re-evaluated and reports to feeling better at this time. He is ready for discharge. Critical Care Time:  
0 Disposition: 
Discharge Note: 
3:50 PM 
The patient has been re-evaluated and is ready for discharge. Reviewed available results with patient. Counseled patient/parent/guardian on diagnosis and care plan. Patient has expressed understanding, and all questions have been answered. Patient agrees with plan and agrees to follow up as recommended, or return to the ED if their symptoms worsen. Discharge instructions have been provided and explained to the patient, along with reasons to return to the ED. PLAN: 
1. Current Discharge Medication List  
  
START taking these medications Details  
butalbital-acetaminophen-caffeine (FIORICET, ESGIC) -40 mg per tablet Take 1 Tab by mouth every six (6) hours as needed for Headache. Qty: 20 Tab, Refills: 0  
  
  
 
2. Follow-up Information Follow up With Specialties Details Why Contact Info u Neurology  Schedule an appointment as soon as possible for a visit with Dr. Patti Wright or another neurologist there [phone] 
[phone] Tomy Fierro MD Family Practice, Sports Medicine Schedule an appointment as soon as possible for a visit  [address] [address] 
[phone] [facility] EMERGENCY DEPT Emergency Medicine  As needed, If symptoms worsen [address] Return to ED if worse Diagnosis Clinical Impression: 1. IIH (idiopathic intracranial hypertension) 2. Migraine without aura and without status migrainosus, not intractable Attestations: This note is prepared by Tiarra Gallegos, acting as Scribe for MD Marisela Delgado MD: The scribe's documentation has been prepared under my direction and personally reviewed by me in its entirety. I confirm that the note above accurately reflects all work, treatment, procedures, and medical decision making performed by me.

## 2018-12-03 NOTE — ED NOTES
Verbal report given to Rebeka Aguilar RN on patient. Report consisted of patient's situation, background, assessment, and recommendations. Information from the following report was reviewed with the receiving nurse. Opportunities for questions and clarification was provided. Bedside rounds were performed. Patient updated on plan of care and questions/concerns addressed.

## 2018-12-03 NOTE — DISCHARGE INSTRUCTIONS
Learning About Pseudotumor Cerebri  What is pseudotumor cerebri? Pseudotumor cerebri (say \"hxg-txz-YFW-kunal SAIR-uh-kasey\") is an increase in pressure of the fluid that surrounds the brain. Your doctor may also call the condition \"idiopathic intracranial hypertension. \" Normally, this clear fluid acts like a buffer to protect the brain. It is called cerebrospinal fluid, or CSF. It's not clear what makes the CSF pressure rise. Some medicines may cause it. Sleep apnea, obesity, and anemia may also play a part. The pressure may build over time. The rising pressure can make the optic nerve swell. The optic nerve is at the back of the eye. It carries visual information from the eye to the brain. The swelling may damage the nerve and lead to permanent loss of some or all of the eyesight. There are several symptoms of rising CSF pressure. Some symptoms may be present all the time. Some might come and go. Symptoms include:  · Severe headaches. They sometimes come with nausea and vomiting. The pain may be centered behind the eyes. · A hissing or roaring sound in the ears that keeps time with your heartbeat. · Problems with vision. You may not be able to see well at the edge of your field of view. You may also lose center vision. It may happen now and then, in one or both eyes, and last for a few seconds at a time. The word \"pseudotumor\" may sound scary. But it's not a brain tumor. The condition gets its name because the pressure inside the skull can mimic what happens when a person has a tumor. How is it treated? · If you are taking medicines that could be raising your CSF pressure, your doctor may change your medicines. · You may get medicines to help your body make less CSF. This can help lower the pressure around the brain. · Your doctor may also give you medicine for headaches. · If sleep apnea, obesity, or anemia may be causing the CSF pressure to rise, your doctor may treat those problems.   · If your vision is getting worse, you may have surgery to make a small opening on the optic nerve to reduce swelling. · Your doctor may implant small tubes inside the skull to drain the extra fluid. This helps lower the pressure around the brain. Follow-up care is a key part of your treatment and safety. Be sure to make and go to all appointments, and call your doctor if you are having problems. It's also a good idea to know your test results and keep a list of the medicines you take. Where can you learn more? Go to http://jesus-janet.info/. Enter [phone] in the search box to learn more about \"Learning About Pseudotumor Cerebri. \"  Current as of: December 3, 2017  Content Version: 11.8  © 8342-9621 [employer]. Care instructions adapted under license by [employer] (which disclaims liability or warranty for this information). If you have questions about a medical condition or this instruction, always ask your healthcare professional. Gustavo Worthy any warranty or liability for your use of this information. Recurring Migraine Headache: Care Instructions  Your Care Instructions  Migraines are painful, throbbing headaches. They often start on one side of the head. They may cause nausea and vomiting and make you sensitive to light, sound, or smell. Some people may have only a few migraines throughout life. Others have them as often as several times a month. The goal of treatment is to reduce the number of migraines you have and relieve your symptoms. Even with treatment, you may continue to have migraines. You play an important role in dealing with your headaches. Work on avoiding things that seem to trigger your migraines. When you feel a headache coming on, act quickly to stop it before it gets worse. Follow-up care is a key part of your treatment and safety. Be sure to make and go to all appointments, and call your doctor if you are having problems. It's also a good idea to know your test results and keep a list of the medicines you take. How can you care for yourself at home? · Do not drive if you have taken a prescription pain medicine. · Rest in a quiet, dark room until your headache is gone. Close your eyes and try to relax or go to sleep. Do not watch TV or read. · Put a cold, moist cloth or cold pack on the painful area for 10 to 20 minutes at a time. Put a thin cloth between the cold pack and your skin. · Have someone gently massage your neck and shoulders. · Take your medicines exactly as prescribed. Call your doctor if you think you are having a problem with your medicine. You will get more details on the specific medicines your doctor prescribes. To prevent migraines  · Keep a headache diary so you can figure out what triggers your headaches. Avoiding triggers may help you prevent headaches. Record when each headache began, how long it lasted, and what the pain was like. Use words like throbbing, aching, stabbing, or dull. Write down any other symptoms you had with the headache. These may include nausea, flashing lights or dark spots, or sensitivity to bright light or loud noise. Note if the headache occurred near your period. List anything that might have triggered the headache. Triggers may include certain foods (chocolate, cheese, wine) or odors, smoke, bright light, stress, or lack of sleep. · If your doctor has prescribed medicine for your migraines, take it as directed. You may have medicine that you take only when you get a migraine and medicine that you take all the time to help prevent migraines. ? If your doctor has prescribed medicine for when you get a headache, take it at the first sign of a migraine, unless your doctor has given you other instructions. ? If your doctor has prescribed medicine to prevent migraines, take it exactly as prescribed. Call your doctor if you think you are having a problem with your medicine.   · Find healthy ways to deal with stress. Migraines are most common during or right after stressful times. Take time to relax before and after you do something that has caused a migraine in the past.  · Try to keep your muscles relaxed by keeping good posture. Check your jaw, face, neck, and shoulder muscles for tension. Try to relax them. When sitting at a desk, change positions often. Stretch for 30 seconds each hour. · Get regular sleep and exercise. · Eat regular meals, and avoid foods and drinks that often trigger migraines. These include chocolate and alcohol, especially red wine and port. Chemicals used in food, such as aspartame and monosodium glutamate (MSG), also can trigger migraines. So can some food additives, such as those found in hot dogs, tijerina, cold cuts, aged cheeses, and pickled foods. · Limit caffeine by not drinking too much coffee, tea, or soda. Do not quit caffeine suddenly, because that can also give you migraines. · Do not smoke or allow others to smoke around you. If you need help quitting, talk to your doctor about stop-smoking programs and medicines. These can increase your chances of quitting for good. · If you are taking birth control pills or hormone therapy, talk to your doctor about whether they are triggering your migraines. When should you call for help? Call 911 anytime you think you may need emergency care. For example, call if:    · You have symptoms of a stroke. These may include:  ? Sudden numbness, tingling, weakness, or loss of movement in your face, arm, or leg, especially on only one side of your body. ? Sudden vision changes. ? Sudden trouble speaking. ? Sudden confusion or trouble understanding simple statements. ? Sudden problems with walking or balance.   ? A sudden, severe headache that is different from past headaches.    Call your doctor now or seek immediate medical care if:    · You develop a fever and a stiff neck.     · You have new nausea and vomiting, or you cannot keep down food or liquids.    Watch closely for changes in your health, and be sure to contact your doctor if:    · You have a headache that does not get better within 1 or 2 days.     · Your headaches get worse or happen more often. Where can you learn more? Go to http://jesus-janet.info/. Enter V975 in the search box to learn more about \"Recurring Migraine Headache: Care Instructions. \"  Current as of: June 4, 2018  Content Version: 11.8  © 7052-9663 [email or web address]. Care instructions adapted under license by [employer] (which disclaims liability or warranty for this information). If you have questions about a medical condition or this instruction, always ask your healthcare professional. [address] any warranty or liability for your use of this information. Migraine Headache: Care Instructions  Your Care Instructions  Migraines are painful, throbbing headaches that often start on one side of the head. They may cause nausea and vomiting and make you sensitive to light, sound, or smell. Without treatment, migraines can last from 4 hours to a few days. Medicines can help prevent migraines or stop them after they have started. Your doctor can help you find which ones work best for you. Follow-up care is a key part of your treatment and safety. Be sure to make and go to all appointments, and call your doctor if you are having problems. It's also a good idea to know your test results and keep a list of the medicines you take. How can you care for yourself at home? · Do not drive if you have taken a prescription pain medicine. · Rest in a quiet, dark room until your headache is gone. Close your eyes, and try to relax or go to sleep. Don't watch TV or read. · Put a cold, moist cloth or cold pack on the painful area for 10 to 20 minutes at a time. Put a thin cloth between the cold pack and your skin.   · Use a warm, moist towel or a heating pad set on low to relax tight shoulder and neck muscles. · Have someone gently massage your neck and shoulders. · Take your medicines exactly as prescribed. Call your doctor if you think you are having a problem with your medicine. You will get more details on the specific medicines your doctor prescribes. · Be careful not to take pain medicine more often than the instructions allow. You could get worse or more frequent headaches when the medicine wears off. To prevent migraines  · Keep a headache diary so you can figure out what triggers your headaches. Avoiding triggers may help you prevent headaches. Record when each headache began, how long it lasted, and what the pain was like. (Was it throbbing, aching, stabbing, or dull?) Write down any other symptoms you had with the headache, such as nausea, flashing lights or dark spots, or sensitivity to bright light or loud noise. Note if the headache occurred near your period. List anything that might have triggered the headache. Triggers may include certain foods (chocolate, cheese, wine) or odors, smoke, bright light, stress, or lack of sleep. · If your doctor has prescribed medicine for your migraines, take it as directed. You may have medicine that you take only when you get a migraine and medicine that you take all the time to help prevent migraines. ? If your doctor has prescribed medicine for when you get a headache, take it at the first sign of a migraine, unless your doctor has given you other instructions. ? If your doctor has prescribed medicine to prevent migraines, take it exactly as prescribed. Call your doctor if you think you are having a problem with your medicine. · Find healthy ways to deal with stress. Migraines are most common during or right after stressful times. Take time to relax before and after you do something that has caused a migraine in the past.  · Try to keep your muscles relaxed by keeping good posture.  Check your jaw, face, neck, and shoulder muscles for tension. Try to relax them. When you sit at a desk, change positions often. And make sure to stretch for 30 seconds each hour. · Get plenty of sleep and exercise. · Eat meals on a regular schedule. Avoid foods and drinks that often trigger migraines. These include chocolate, alcohol (especially red wine and port), aspartame, monosodium glutamate (MSG), and some additives found in foods (such as hot dogs, tijerina, cold cuts, aged cheeses, and pickled foods). · Limit caffeine. Don't drink too much coffee, tea, or soda. But don't quit caffeine suddenly. That can also give you migraines. · Do not smoke or allow others to smoke around you. If you need help quitting, talk to your doctor about stop-smoking programs and medicines. These can increase your chances of quitting for good. · If you are taking birth control pills or hormone therapy, talk to your doctor about whether they are triggering your migraines. When should you call for help? Call 911 anytime you think you may need emergency care. For example, call if:    · You have signs of a stroke. These may include:  ? Sudden numbness, paralysis, or weakness in your face, arm, or leg, especially on only one side of your body. ? Sudden vision changes. ? Sudden trouble speaking. ? Sudden confusion or trouble understanding simple statements. ? Sudden problems with walking or balance. ? A sudden, severe headache that is different from past headaches.    Call your doctor now or seek immediate medical care if:    · You have new or worse nausea and vomiting.     · You have a new or higher fever.     · Your headache gets much worse.    Watch closely for changes in your health, and be sure to contact your doctor if:    · You are not getting better after 2 days (48 hours). Where can you learn more? Go to http://jesus-janet.info/.   Enter S172 in the search box to learn more about \"Migraine Headache: Care Instructions. \"  Current as of: June 4, 2018  Content Version: 11.8  © 1612-2311 [employer], [facility]. Care instructions adapted under license by [employer] (which disclaims liability or warranty for this information). If you have questions about a medical condition or this instruction, always ask your healthcare professional. [place] [address] any warranty or liability for your use of this information.

## 2020-08-08 ENCOUNTER — HOSPITAL ENCOUNTER (EMERGENCY)
Age: 40
Discharge: HOME | End: 2020-08-08
Attending: EMERGENCY MEDICINE
Payer: COMMERCIAL

## 2020-08-08 VITALS
HEIGHT: 62 IN | WEIGHT: 230 LBS | BODY MASS INDEX: 42.33 KG/M2 | HEART RATE: 88 BPM | RESPIRATION RATE: 18 BRPM | TEMPERATURE: 98.5 F | OXYGEN SATURATION: 100 % | DIASTOLIC BLOOD PRESSURE: 76 MMHG | SYSTOLIC BLOOD PRESSURE: 114 MMHG

## 2020-08-08 DIAGNOSIS — R11.2 NON-INTRACTABLE VOMITING WITH NAUSEA, UNSPECIFIED VOMITING TYPE: ICD-10-CM

## 2020-08-08 DIAGNOSIS — R19.7 DIARRHEA, UNSPECIFIED TYPE: ICD-10-CM

## 2020-08-08 DIAGNOSIS — R10.9 ACUTE ABDOMINAL PAIN: Primary | ICD-10-CM

## 2020-08-08 LAB
ALBUMIN SERPL-MCNC: 3.8 G/DL (ref 3.5–5)
ALBUMIN/GLOB SERPL: 1.1 {RATIO} (ref 1.1–2.2)
ALP SERPL-CCNC: 49 U/L (ref 45–117)
ALT SERPL-CCNC: 16 U/L (ref 12–78)
ANION GAP SERPL CALC-SCNC: 9 MMOL/L (ref 5–15)
APPEARANCE UR: CLEAR
AST SERPL-CCNC: 12 U/L (ref 15–37)
BACTERIA URNS QL MICRO: NEGATIVE /HPF
BASOPHILS # BLD: 0 K/UL (ref 0–0.1)
BASOPHILS NFR BLD: 0 % (ref 0–1)
BILIRUB SERPL-MCNC: 0.6 MG/DL (ref 0.2–1)
BILIRUB UR QL: NEGATIVE
BUN SERPL-MCNC: 7 MG/DL (ref 6–20)
BUN/CREAT SERPL: 7 (ref 12–20)
CALCIUM SERPL-MCNC: 8.6 MG/DL (ref 8.5–10.1)
CHLORIDE SERPL-SCNC: 106 MMOL/L (ref 97–108)
CO2 SERPL-SCNC: 22 MMOL/L (ref 21–32)
COLOR UR: NORMAL
CREAT SERPL-MCNC: 0.96 MG/DL (ref 0.55–1.02)
DIFFERENTIAL METHOD BLD: ABNORMAL
EOSINOPHIL # BLD: 0 K/UL (ref 0–0.4)
EOSINOPHIL NFR BLD: 0 % (ref 0–7)
EPITH CASTS URNS QL MICRO: NORMAL /LPF
ERYTHROCYTE [DISTWIDTH] IN BLOOD BY AUTOMATED COUNT: 13.2 % (ref 11.5–14.5)
GLOBULIN SER CALC-MCNC: 3.6 G/DL (ref 2–4)
GLUCOSE SERPL-MCNC: 93 MG/DL (ref 65–100)
GLUCOSE UR STRIP.AUTO-MCNC: NEGATIVE MG/DL
HCG UR QL: NEGATIVE
HCT VFR BLD AUTO: 41.3 % (ref 35–47)
HGB BLD-MCNC: 13.2 G/DL (ref 11.5–16)
HGB UR QL STRIP: NEGATIVE
IMM GRANULOCYTES # BLD AUTO: 0 K/UL (ref 0–0.04)
IMM GRANULOCYTES NFR BLD AUTO: 0 % (ref 0–0.5)
KETONES UR QL STRIP.AUTO: NEGATIVE MG/DL
LEUKOCYTE ESTERASE UR QL STRIP.AUTO: NEGATIVE
LIPASE SERPL-CCNC: 36 U/L (ref 73–393)
LYMPHOCYTES # BLD: 1.2 K/UL (ref 0.8–3.5)
LYMPHOCYTES NFR BLD: 19 % (ref 12–49)
MCH RBC QN AUTO: 30.1 PG (ref 26–34)
MCHC RBC AUTO-ENTMCNC: 32 G/DL (ref 30–36.5)
MCV RBC AUTO: 94.3 FL (ref 80–99)
MONOCYTES # BLD: 0.3 K/UL (ref 0–1)
MONOCYTES NFR BLD: 5 % (ref 5–13)
NEUTS SEG # BLD: 4.6 K/UL (ref 1.8–8)
NEUTS SEG NFR BLD: 76 % (ref 32–75)
NITRITE UR QL STRIP.AUTO: NEGATIVE
NRBC # BLD: 0 K/UL (ref 0–0.01)
NRBC BLD-RTO: 0 PER 100 WBC
PH UR STRIP: 8 [PH] (ref 5–8)
PLATELET # BLD AUTO: 157 K/UL (ref 150–400)
PMV BLD AUTO: 11.4 FL (ref 8.9–12.9)
POTASSIUM SERPL-SCNC: 4.2 MMOL/L (ref 3.5–5.1)
PROT SERPL-MCNC: 7.4 G/DL (ref 6.4–8.2)
PROT UR STRIP-MCNC: NEGATIVE MG/DL
RBC # BLD AUTO: 4.38 M/UL (ref 3.8–5.2)
RBC #/AREA URNS HPF: NORMAL /HPF (ref 0–5)
SODIUM SERPL-SCNC: 137 MMOL/L (ref 136–145)
SP GR UR REFRACTOMETRY: 1.01 (ref 1–1.03)
UA: UC IF INDICATED,UAUC: NORMAL
UROBILINOGEN UR QL STRIP.AUTO: 0.2 EU/DL (ref 0.2–1)
WBC # BLD AUTO: 6.1 K/UL (ref 3.6–11)
WBC URNS QL MICRO: NORMAL /HPF (ref 0–4)

## 2020-08-08 PROCEDURE — 96361 HYDRATE IV INFUSION ADD-ON: CPT

## 2020-08-08 PROCEDURE — 85025 COMPLETE CBC W/AUTO DIFF WBC: CPT

## 2020-08-08 PROCEDURE — 36415 COLL VENOUS BLD VENIPUNCTURE: CPT

## 2020-08-08 PROCEDURE — 96374 THER/PROPH/DIAG INJ IV PUSH: CPT

## 2020-08-08 PROCEDURE — 80053 COMPREHEN METABOLIC PANEL: CPT

## 2020-08-08 PROCEDURE — 99284 EMERGENCY DEPT VISIT MOD MDM: CPT

## 2020-08-08 PROCEDURE — 83690 ASSAY OF LIPASE: CPT

## 2020-08-08 PROCEDURE — 81001 URINALYSIS AUTO W/SCOPE: CPT

## 2020-08-08 PROCEDURE — 96375 TX/PRO/DX INJ NEW DRUG ADDON: CPT

## 2020-08-08 PROCEDURE — 74011250636 HC RX REV CODE- 250/636: Performed by: EMERGENCY MEDICINE

## 2020-08-08 PROCEDURE — 81025 URINE PREGNANCY TEST: CPT

## 2020-08-08 RX ORDER — ONDANSETRON 2 MG/ML
4 INJECTION INTRAMUSCULAR; INTRAVENOUS
Status: COMPLETED | OUTPATIENT
Start: 2020-08-08 | End: 2020-08-08

## 2020-08-08 RX ORDER — DICYCLOMINE HYDROCHLORIDE 10 MG/1
10 CAPSULE ORAL 4 TIMES DAILY
Qty: 20 CAP | Refills: 0 | Status: SHIPPED | OUTPATIENT
Start: 2020-08-08 | End: 2020-08-13

## 2020-08-08 RX ORDER — ONDANSETRON 4 MG/1
4 TABLET, ORALLY DISINTEGRATING ORAL
Qty: 10 TAB | Refills: 0 | OUTPATIENT
Start: 2020-08-08 | End: 2022-01-17

## 2020-08-08 RX ORDER — KETOROLAC TROMETHAMINE 30 MG/ML
30 INJECTION, SOLUTION INTRAMUSCULAR; INTRAVENOUS
Status: COMPLETED | OUTPATIENT
Start: 2020-08-08 | End: 2020-08-08

## 2020-08-08 RX ADMIN — KETOROLAC TROMETHAMINE 30 MG: 30 INJECTION, SOLUTION INTRAMUSCULAR; INTRAVENOUS at 13:20

## 2020-08-08 RX ADMIN — SODIUM CHLORIDE 1000 ML: 900 INJECTION, SOLUTION INTRAVENOUS at 13:20

## 2020-08-08 RX ADMIN — ONDANSETRON 4 MG: 2 INJECTION INTRAMUSCULAR; INTRAVENOUS at 13:20

## 2020-08-08 NOTE — ED NOTES
Patient presents to ED with c/o abdominal pain with diarrhea and vomiting since last night after eating [employer]. Patient is alert and oriented x 4 and in no acute distress at this time. Respirations are at a regular rate, depth, and pattern. Patient updated on plan of care and has no questions or concerns at this time. Call bell within reach. Will continue to monitor. Please reference nursing assessment. Emergency Department Nursing Plan of Care       The Nursing Plan of Care is developed from the Nursing assessment and Emergency Department Attending provider initial evaluation. The plan of care may be reviewed in the ED Provider note.     The Plan of Care was developed with the following considerations:   Patient / Family readiness to learn indicated by:verbalized understanding and successful return demonstration  Persons(s) to be included in education: patient  Barriers to Learning/Limitations:No    Signed     Tricia Lyons RN    8/8/2020   1:26 PM

## 2020-08-08 NOTE — DISCHARGE INSTRUCTIONS
Patient Education        Abdominal Pain: Care Instructions  Your Care Instructions     Abdominal pain has many possible causes. Some aren't serious and get better on their own in a few days. Others need more testing and treatment. If your pain continues or gets worse, you need to be rechecked and may need more tests to find out what is wrong. You may need surgery to correct the problem. Don't ignore new symptoms, such as fever, nausea and vomiting, urination problems, pain that gets worse, and dizziness. These may be signs of a more serious problem. Your doctor may have recommended a follow-up visit in the next 8 to 12 hours. If you are not getting better, you may need more tests or treatment. The doctor has checked you carefully, but problems can develop later. If you notice any problems or new symptoms, get medical treatment right away. Follow-up care is a key part of your treatment and safety. Be sure to make and go to all appointments, and call your doctor if you are having problems. It's also a good idea to know your test results and keep a list of the medicines you take. How can you care for yourself at home? · Rest until you feel better. · To prevent dehydration, drink plenty of fluids, enough so that your urine is light yellow or clear like water. Choose water and other caffeine-free clear liquids until you feel better. If you have kidney, heart, or liver disease and have to limit fluids, talk with your doctor before you increase the amount of fluids you drink. · If your stomach is upset, eat mild foods, such as rice, dry toast or crackers, bananas, and applesauce. Try eating several small meals instead of two or three large ones. · Wait until 48 hours after all symptoms have gone away before you have spicy foods, alcohol, and drinks that contain caffeine. · Do not eat foods that are high in fat. · Avoid anti-inflammatory medicines such as aspirin, ibuprofen (Advil, Motrin), and naproxen (Aleve). These can cause stomach upset. Talk to your doctor if you take daily aspirin for another health problem. When should you call for help? [id number] anytime you think you may need emergency care. For example, call if:  · You passed out (lost consciousness). · You pass maroon or very bloody stools. · You vomit blood or what looks like coffee grounds. · You have new, severe belly pain. Call your doctor now or seek immediate medical care if:  · Your pain gets worse, especially if it becomes focused in one area of your belly. · You have a new or higher fever. · Your stools are black and look like tar, or they have streaks of blood. · You have unexpected vaginal bleeding. · You have symptoms of a urinary tract infection. These may include:  ? Pain when you urinate. ? Urinating more often than usual.  ? Blood in your urine. · You are dizzy or lightheaded, or you feel like you may faint. Watch closely for changes in your health, and be sure to contact your doctor if:  · You are not getting better after 1 day (24 hours). Where can you learn more? Go to http://jesusDealentrajanet.info/  Enter M522 in the search box to learn more about \"Abdominal Pain: Care Instructions. \"  Current as of: June 26, 2019               Content Version: 12.5  © 9000-3180 [employer]. Care instructions adapted under license by [employer] (which disclaims liability or warranty for this information). If you have questions about a medical condition or this instruction, always ask your healthcare professional. [place] [address] any warranty or liability for your use of this information. Patient Education        Nausea and Vomiting: Care Instructions  Your Care Instructions     When you are nauseated, you may feel weak and sweaty and notice a lot of saliva in your mouth. Nausea often leads to vomiting.  Most of the time you do not need to worry about nausea and vomiting, but they can be signs of other illnesses. Two common causes of nausea and vomiting are stomach flu and food poisoning. Nausea and vomiting from viral stomach flu will usually start to improve within 24 hours. Nausea and vomiting from food poisoning may last from 12 to 48 hours. The doctor has checked you carefully, but problems can develop later. If you notice any problems or new symptoms, get medical treatment right away. Follow-up care is a key part of your treatment and safety. Be sure to make and go to all appointments, and call your doctor if you are having problems. It's also a good idea to know your test results and keep a list of the medicines you take. How can you care for yourself at home? · To prevent dehydration, drink plenty of fluids, enough so that your urine is light yellow or clear like water. Choose water and other caffeine-free clear liquids until you feel better. If you have kidney, heart, or liver disease and have to limit fluids, talk with your doctor before you increase the amount of fluids you drink. · Rest in bed until you feel better. · When you are able to eat, try clear soups, mild foods, and liquids until all symptoms are gone for 12 to 48 hours. Other good choices include dry toast, crackers, cooked cereal, and gelatin dessert, such as Jell-O. When should you call for help? [id number] anytime you think you may need emergency care. For example, call if:  · You passed out (lost consciousness). Call your doctor now or seek immediate medical care if:  · You have symptoms of dehydration, such as:  ? Dry eyes and a dry mouth. ? Passing only a little dark urine. ? Feeling thirstier than usual.  · You have new or worsening belly pain. · You have a new or higher fever. · You vomit blood or what looks like coffee grounds. Watch closely for changes in your health, and be sure to contact your doctor if:  · You have ongoing nausea and vomiting. · Your vomiting is getting worse.   · Your vomiting lasts longer than 2 days. · You are not getting better as expected. Where can you learn more? Go to http://www.RFinity.com/  Enter H591 in the search box to learn more about \"Nausea and Vomiting: Care Instructions. \"  Current as of: 2019               Content Version: 12.5  © 3402-6636 [employer]. Care instructions adapted under license by [employer] (which disclaims liability or warranty for this information). If you have questions about a medical condition or this instruction, always ask your healthcare professional. [address] any warranty or liability for your use of this information. [employer] Activation    Thank you for requesting access to [employer]. Please follow the instructions below to securely access and download your online medical record. [employer] allows you to send messages to your doctor, view your test results, renew your prescriptions, schedule appointments, and more. How Do I Sign Up? 1. In your internet browser, go to [email or web address] to Have You  2. Click on the First Time User? Click Here link in the Sign In box. You will be redirect to the New Member Sign Up page. 3. Enter your [employer] Access Code exactly as it appears below. You will not need to use this code after youve completed the sign-up process. If you do not sign up before the expiration date, you must request a new code. [employer] Access Code: [email or web address]  Expires: 2020 12:18 PM (This is the date your [employer] access code will )    4. Enter the last four digits of your Social Security Number (xxxx) and Date of Birth (mm/dd/yyyy) as indicated and click Submit. You will be taken to the next sign-up page. 5. Create a [employer] ID. This will be your [employer] login ID and cannot be changed, so think of one that is secure and easy to remember. 6. Create a [employer] password. You can change your password at any time.   7. Enter your Password Reset Question and Answer. This can be used at a later time if you forget your password. 8. Enter your e-mail address. You will receive e-mail notification when new information is available in [address] 9. Click Sign Up. You can now view and download portions of your medical record. 10. Click the Download Summary menu link to download a portable copy of your medical information. Additional Information    If you have questions, please visit the Frequently Asked Questions section of the [employer] website at [email or web address] Remember, [employer] is NOT to be used for urgent needs. For medical emergencies, dial 911.

## 2020-08-08 NOTE — ED PROVIDER NOTES
EMERGENCY DEPARTMENT HISTORY AND PHYSICAL EXAM      Date: 8/8/2020  Patient Name: Irma Slaughter    History of Presenting Illness     Chief Complaint   Patient presents with    Abdominal Pain     with GENERALIZED body aches, vomiting and diarrhea, lower BACK pain    Skin Problem     boil on LEFT upper arm       History Provided By: Patient    HPI: Irma Slaughter, 45 y.o. female presents to the ED with cc of abdominal pain nausea, vomiting diarrhea and arm swelling. The patient denies any obvious sick contacts or exposure to COVID-19. She has no associated fever, chills, cough. She does complain of body aches. She is not been on any recent antibiotics. She states she smoked marijuana this morning to assist with her vomiting. She denies any pelvic pain or discharge or urinary symptoms. She has no history of kidney stones. She denies any prior abdominal surgical history or history of STDs. Her pain is described as cramping in nature and is rated at 4/10. She also complains of swelling in her armpit and concerned about possible boil. She denies a history of MRSA. She denies any illicit drug use including IV drug abuse. Patient states she did eat fried chicken yesterday but did have pain prior to eating. There are no other complaints, changes, or physical findings at this time. PCP: Radha Aldana MD    No current facility-administered medications on file prior to encounter. Current Outpatient Medications on File Prior to Encounter   Medication Sig Dispense Refill    acetaZOLAMIDE SR (DIAMOX) 500 mg capsule TAKE 1 CAPSULE BY MOUTH TWICE A DAY  11    butalbital-acetaminophen-caffeine (FIORICET, ESGIC) -40 mg per tablet Take 1 Tab by mouth every six (6) hours as needed for Headache.  20 Tab 0       Past History     Past Medical History:  Past Medical History:   Diagnosis Date    Anxiety     Hypertension     Other ill-defined conditions(799.89)     Hydrocephalus - mild    Psychiatric disorder     Anxiety       Past Surgical History:  Past Surgical History:   Procedure Laterality Date    HX  SECTION  ,03    x2    HX HEENT      LP to relieve right eye pressure    HX HYSTERECTOMY      Partial    HX LAPAROSCOPIC SUPRACERVICAL HYSTERECTOMY  14    HX OTHER SURGICAL  2013    [employer]    HX TUBAL LIGATION         Family History:  Family History   Problem Relation Age of Onset    Diabetes Maternal Grandmother     Stroke Maternal Grandmother        Social History:  Social History     Tobacco Use    Smoking status: Former Smoker     Last attempt to quit: 3/27/2012     Years since quittin.3    Smokeless tobacco: Never Used    Tobacco comment: two times weekly   Substance Use Topics    Alcohol use: Yes     Comment: 4/month    Drug use: Yes     Types: Marijuana       Allergies:  No Known Allergies      Review of Systems   Review of Systems   Constitutional: Negative. Negative for chills and fever. HENT: Negative. Negative for congestion and rhinorrhea. Respiratory: Negative. Negative for cough, chest tightness and wheezing. Cardiovascular: Negative. Negative for chest pain and palpitations. Gastrointestinal: Positive for abdominal pain, diarrhea, nausea and vomiting. Negative for constipation. Endocrine: Negative. Genitourinary: Negative. Negative for decreased urine volume, flank pain, hematuria and pelvic pain. Musculoskeletal: Negative. Negative for back pain and neck pain. Skin: Positive for rash. Negative for color change and pallor. Neurological: Negative. Negative for dizziness, seizures, weakness, numbness and headaches. Hematological: Negative. Negative for adenopathy. Psychiatric/Behavioral: Negative. All other systems reviewed and are negative. Physical Exam   Physical Exam  Vitals signs reviewed. Constitutional:       General: She is not in acute distress. Appearance: She is well-developed.  She is not diaphoretic. HENT:      Head: Normocephalic and atraumatic. Mouth/Throat:      Pharynx: No oropharyngeal exudate. Eyes:      General: No scleral icterus. Right eye: No discharge. Left eye: No discharge. Conjunctiva/sclera: Conjunctivae normal.      Pupils: Pupils are equal, round, and reactive to light. Neck:      Musculoskeletal: Normal range of motion and neck supple. Vascular: No JVD. Cardiovascular:      Rate and Rhythm: Normal rate and regular rhythm. Heart sounds: Normal heart sounds. No murmur. No friction rub. No gallop. Pulmonary:      Effort: Pulmonary effort is normal. No respiratory distress. Breath sounds: Normal breath sounds. No stridor. No wheezing or rales. Chest:      Chest wall: No tenderness. Abdominal:      General: Bowel sounds are normal. There is no distension. Palpations: Abdomen is soft. There is no mass. Tenderness: There is generalized abdominal tenderness. There is no right CVA tenderness, left CVA tenderness, guarding or rebound. Hernia: There is no hernia in the umbilical area, ventral area, left inguinal area or right inguinal area. Skin:     General: Skin is warm. Coloration: Skin is not pale. Findings: No rash. Comments: There is a 2 cm soft non-fixed movable cyst in the left upper medial arm area. This is most consistent with probable lipoma. There is no warmth or fluctuance noted. There is no axillary adenopathy reactive to this. Neurological:      Mental Status: She is alert and oriented to person, place, and time. Cranial Nerves: No cranial nerve deficit. Motor: No abnormal muscle tone. Coordination: Coordination normal.      Deep Tendon Reflexes: Reflexes normal.       2:04 PM patient reevaluated. She states her pain has improved to 3 out of 10. She does complain of mild back pain. We discussed her results. We discussed performing a CT for further evaluation. We both agreed at this time it is probably not warranted as her condition has improved and none of her blood work is abnormal.  She is aware of the importance to return if she develops any worsening symptoms fever chills nausea vomit abdominal pain or any other concerns. Diagnostic Study Results     Labs -     Recent Results (from the past 12 hour(s))   CBC WITH AUTOMATED DIFF    Collection Time: 08/08/20 12:50 PM   Result Value Ref Range    WBC 6.1 3.6 - 11.0 K/uL    RBC 4.38 3.80 - 5.20 M/uL    HGB 13.2 11.5 - 16.0 g/dL    HCT 41.3 35.0 - 47.0 %    MCV 94.3 80.0 - 99.0 FL    MCH 30.1 26.0 - 34.0 PG    MCHC 32.0 30.0 - 36.5 g/dL    RDW 13.2 11.5 - 14.5 %    PLATELET 781 366 - 857 K/uL    MPV 11.4 8.9 - 12.9 FL    NRBC 0.0 0  WBC    ABSOLUTE NRBC 0.00 0.00 - 0.01 K/uL    NEUTROPHILS 76 (H) 32 - 75 %    LYMPHOCYTES 19 12 - 49 %    MONOCYTES 5 5 - 13 %    EOSINOPHILS 0 0 - 7 %    BASOPHILS 0 0 - 1 %    IMMATURE GRANULOCYTES 0 0.0 - 0.5 %    ABS. NEUTROPHILS 4.6 1.8 - 8.0 K/UL    ABS. LYMPHOCYTES 1.2 0.8 - 3.5 K/UL    ABS. MONOCYTES 0.3 0.0 - 1.0 K/UL    ABS. EOSINOPHILS 0.0 0.0 - 0.4 K/UL    ABS. BASOPHILS 0.0 0.0 - 0.1 K/UL    ABS. IMM. GRANS. 0.0 0.00 - 0.04 K/UL    DF AUTOMATED     METABOLIC PANEL, COMPREHENSIVE    Collection Time: 08/08/20 12:50 PM   Result Value Ref Range    Sodium 137 136 - 145 mmol/L    Potassium 4.2 3.5 - 5.1 mmol/L    Chloride 106 97 - 108 mmol/L    CO2 22 21 - 32 mmol/L    Anion gap 9 5 - 15 mmol/L    Glucose 93 65 - 100 mg/dL    BUN 7 6 - 20 MG/DL    Creatinine 0.96 0.55 - 1.02 MG/DL    BUN/Creatinine ratio 7 (L) 12 - 20      GFR est AA >60 >60 ml/min/1.73m2    GFR est non-AA >60 >60 ml/min/1.73m2    Calcium 8.6 8.5 - 10.1 MG/DL    Bilirubin, total 0.6 0.2 - 1.0 MG/DL    ALT (SGPT) 16 12 - 78 U/L    AST (SGOT) 12 (L) 15 - 37 U/L    Alk.  phosphatase 49 45 - 117 U/L    Protein, total 7.4 6.4 - 8.2 g/dL    Albumin 3.8 3.5 - 5.0 g/dL    Globulin 3.6 2.0 - 4.0 g/dL    A-G Ratio 1.1 1.1 - 2.2     LIPASE    Collection Time: 08/08/20 12:50 PM   Result Value Ref Range    Lipase 36 (L) 73 - 393 U/L   URINALYSIS W/ REFLEX CULTURE    Collection Time: 08/08/20  1:04 PM    Specimen: Urine   Result Value Ref Range    Color YELLOW/STRAW      Appearance CLEAR CLEAR      Specific gravity 1.015 1.003 - 1.030      pH (UA) 8.0 5.0 - 8.0      Protein Negative NEG mg/dL    Glucose Negative NEG mg/dL    Ketone Negative NEG mg/dL    Bilirubin Negative NEG      Blood Negative NEG      Urobilinogen 0.2 0.2 - 1.0 EU/dL    Nitrites Negative NEG      Leukocyte Esterase Negative NEG      WBC 0-4 0 - 4 /hpf    RBC 0-5 0 - 5 /hpf    Epithelial cells FEW FEW /lpf    Bacteria Negative NEG /hpf    UA:UC IF INDICATED CULTURE NOT INDICATED BY UA RESULT CNI     HCG URINE, QL. - POC    Collection Time: 08/08/20  1:06 PM   Result Value Ref Range    Pregnancy test,urine (POC) Negative NEG         Radiologic Studies -   No orders to display     CT Results  (Last 48 hours)    None        CXR Results  (Last 48 hours)    None          Medical Decision Making   I am the first provider for this patient. I reviewed the vital signs, available nursing notes, past medical history, past surgical history, family history and social history. Vital Signs-Reviewed the patient's vital signs. Patient Vitals for the past 12 hrs:   Temp Pulse Resp BP SpO2   08/08/20 1218 98.5 °F (36.9 °C) 99 16 114/76 100 %           Records Reviewed: Nursing Notes and Old Medical Records    Provider Notes (Medical Decision Making):   Differential diagnosis-gastroenteritis, appendicitis, colitis, diverticulitis, UTI, renal colic, abscess, lipoma    Impression/plan-45year-old immunocompetent female to the ER with nausea, vomiting diarrhea and diffuse abdominal pain. Plan will be to obtain labs and treat symptoms. Suspect probable gastroenteritis. May need further work-up if no response to treatment and abnormal lab work.   She does have a swelling in her arm consistent with probable lipoma. Doubt this represents an infectious or malignant process. Final disposition pending response to treatment and work-up. ED Course:   Initial assessment performed. The patients presenting problems have been discussed, and they are in agreement with the care plan formulated and outlined with them. I have encouraged them to ask questions as they arise throughout their visit. Dion Rodriguez MD      Disposition:    Home anti-medic and antispasmodic    DISCHARGE PLAN:  1. Current Discharge Medication List      START taking these medications    Details   ondansetron (Zofran ODT) 4 mg disintegrating tablet Take 1 Tab by mouth every eight (8) hours as needed for Nausea or Vomiting. Qty: 10 Tab, Refills: 0      dicyclomine (BentyL) 10 mg capsule Take 1 Cap by mouth four (4) times daily for 5 days. Qty: 20 Cap, Refills: 0           2. Follow-up Information     Follow up With Specialties Details Why Contact Info    Louis Espino MD Family Medicine, Sports Medicine Schedule an appointment as soon as possible for a visit in 1 week  [address] [phone]      [facility] EMERGENCY DEPT Emergency Medicine  If symptoms worsen [address]        3. Return to ED if worse     Diagnosis     Clinical Impression:   1. Acute abdominal pain    2. Non-intractable vomiting with nausea, unspecified vomiting type    3. Diarrhea, unspecified type        Attestations:    Dion Rodriguez MD    Please note that this dictation was completed with [employer], the computer voice recognition software. Quite often unanticipated grammatical, syntax, homophones, and other interpretive errors are inadvertently transcribed by the computer software. Please disregard these errors. Please excuse any errors that have escaped final proofreading. Thank you.

## 2020-08-09 ENCOUNTER — APPOINTMENT (OUTPATIENT)
Dept: CT IMAGING | Age: 40
End: 2020-08-09
Attending: NURSE PRACTITIONER
Payer: COMMERCIAL

## 2020-08-09 ENCOUNTER — HOSPITAL ENCOUNTER (EMERGENCY)
Age: 40
Discharge: HOME | End: 2020-08-09
Attending: EMERGENCY MEDICINE | Admitting: EMERGENCY MEDICINE
Payer: COMMERCIAL

## 2020-08-09 ENCOUNTER — APPOINTMENT (OUTPATIENT)
Dept: ULTRASOUND IMAGING | Age: 40
End: 2020-08-09
Attending: NURSE PRACTITIONER
Payer: COMMERCIAL

## 2020-08-09 VITALS
SYSTOLIC BLOOD PRESSURE: 104 MMHG | BODY MASS INDEX: 42.33 KG/M2 | RESPIRATION RATE: 16 BRPM | HEIGHT: 62 IN | DIASTOLIC BLOOD PRESSURE: 80 MMHG | OXYGEN SATURATION: 100 % | WEIGHT: 230 LBS | TEMPERATURE: 98.8 F | HEART RATE: 66 BPM

## 2020-08-09 DIAGNOSIS — R10.31 ABDOMINAL PAIN, RIGHT LOWER QUADRANT: Primary | ICD-10-CM

## 2020-08-09 LAB
ALBUMIN SERPL-MCNC: 3.9 G/DL (ref 3.5–5)
ALBUMIN/GLOB SERPL: 1.1 {RATIO} (ref 1.1–2.2)
ALP SERPL-CCNC: 50 U/L (ref 45–117)
ALT SERPL-CCNC: 15 U/L (ref 12–78)
ANION GAP SERPL CALC-SCNC: 9 MMOL/L (ref 5–15)
APPEARANCE UR: CLEAR
AST SERPL-CCNC: 10 U/L (ref 15–37)
BACTERIA URNS QL MICRO: NEGATIVE /HPF
BASOPHILS # BLD: 0 K/UL (ref 0–0.1)
BASOPHILS NFR BLD: 1 % (ref 0–1)
BILIRUB SERPL-MCNC: 0.6 MG/DL (ref 0.2–1)
BILIRUB UR QL: NEGATIVE
BUN SERPL-MCNC: 9 MG/DL (ref 6–20)
BUN/CREAT SERPL: 8 (ref 12–20)
CALCIUM SERPL-MCNC: 8.6 MG/DL (ref 8.5–10.1)
CHLORIDE SERPL-SCNC: 107 MMOL/L (ref 97–108)
CO2 SERPL-SCNC: 25 MMOL/L (ref 21–32)
COLOR UR: ABNORMAL
CREAT SERPL-MCNC: 1.09 MG/DL (ref 0.55–1.02)
DIFFERENTIAL METHOD BLD: NORMAL
EOSINOPHIL # BLD: 0.1 K/UL (ref 0–0.4)
EOSINOPHIL NFR BLD: 1 % (ref 0–7)
EPITH CASTS URNS QL MICRO: ABNORMAL /LPF
ERYTHROCYTE [DISTWIDTH] IN BLOOD BY AUTOMATED COUNT: 13.2 % (ref 11.5–14.5)
GLOBULIN SER CALC-MCNC: 3.6 G/DL (ref 2–4)
GLUCOSE SERPL-MCNC: 75 MG/DL (ref 65–100)
GLUCOSE UR STRIP.AUTO-MCNC: NEGATIVE MG/DL
HCT VFR BLD AUTO: 42 % (ref 35–47)
HGB BLD-MCNC: 13.5 G/DL (ref 11.5–16)
HGB UR QL STRIP: NEGATIVE
IMM GRANULOCYTES # BLD AUTO: 0 K/UL (ref 0–0.04)
IMM GRANULOCYTES NFR BLD AUTO: 0 % (ref 0–0.5)
KETONES UR QL STRIP.AUTO: NEGATIVE MG/DL
LACTATE SERPL-SCNC: 1.2 MMOL/L (ref 0.4–2)
LEUKOCYTE ESTERASE UR QL STRIP.AUTO: NEGATIVE
LIPASE SERPL-CCNC: 50 U/L (ref 73–393)
LYMPHOCYTES # BLD: 1.8 K/UL (ref 0.8–3.5)
LYMPHOCYTES NFR BLD: 29 % (ref 12–49)
MCH RBC QN AUTO: 30.4 PG (ref 26–34)
MCHC RBC AUTO-ENTMCNC: 32.1 G/DL (ref 30–36.5)
MCV RBC AUTO: 94.6 FL (ref 80–99)
MONOCYTES # BLD: 0.4 K/UL (ref 0–1)
MONOCYTES NFR BLD: 7 % (ref 5–13)
MUCOUS THREADS URNS QL MICRO: ABNORMAL /LPF
NEUTS SEG # BLD: 3.8 K/UL (ref 1.8–8)
NEUTS SEG NFR BLD: 62 % (ref 32–75)
NITRITE UR QL STRIP.AUTO: NEGATIVE
NRBC # BLD: 0 K/UL (ref 0–0.01)
NRBC BLD-RTO: 0 PER 100 WBC
PH UR STRIP: 5.5 [PH] (ref 5–8)
PLATELET # BLD AUTO: 187 K/UL (ref 150–400)
PMV BLD AUTO: 11.6 FL (ref 8.9–12.9)
POTASSIUM SERPL-SCNC: 3.7 MMOL/L (ref 3.5–5.1)
PROT SERPL-MCNC: 7.5 G/DL (ref 6.4–8.2)
PROT UR STRIP-MCNC: NEGATIVE MG/DL
RBC # BLD AUTO: 4.44 M/UL (ref 3.8–5.2)
RBC #/AREA URNS HPF: ABNORMAL /HPF (ref 0–5)
SODIUM SERPL-SCNC: 141 MMOL/L (ref 136–145)
SP GR UR REFRACTOMETRY: 1.02 (ref 1–1.03)
UA: UC IF INDICATED,UAUC: ABNORMAL
UROBILINOGEN UR QL STRIP.AUTO: 1 EU/DL (ref 0.2–1)
WBC # BLD AUTO: 6.2 K/UL (ref 3.6–11)
WBC URNS QL MICRO: ABNORMAL /HPF (ref 0–4)

## 2020-08-09 PROCEDURE — 74011250637 HC RX REV CODE- 250/637: Performed by: NURSE PRACTITIONER

## 2020-08-09 PROCEDURE — 76856 US EXAM PELVIC COMPLETE: CPT

## 2020-08-09 PROCEDURE — 85025 COMPLETE CBC W/AUTO DIFF WBC: CPT

## 2020-08-09 PROCEDURE — 96374 THER/PROPH/DIAG INJ IV PUSH: CPT

## 2020-08-09 PROCEDURE — 99284 EMERGENCY DEPT VISIT MOD MDM: CPT

## 2020-08-09 PROCEDURE — 96376 TX/PRO/DX INJ SAME DRUG ADON: CPT

## 2020-08-09 PROCEDURE — 74011250636 HC RX REV CODE- 250/636: Performed by: NURSE PRACTITIONER

## 2020-08-09 PROCEDURE — 74011636320 HC RX REV CODE- 636/320: Performed by: EMERGENCY MEDICINE

## 2020-08-09 PROCEDURE — 80053 COMPREHEN METABOLIC PANEL: CPT

## 2020-08-09 PROCEDURE — 36415 COLL VENOUS BLD VENIPUNCTURE: CPT

## 2020-08-09 PROCEDURE — 81001 URINALYSIS AUTO W/SCOPE: CPT

## 2020-08-09 PROCEDURE — 83605 ASSAY OF LACTIC ACID: CPT

## 2020-08-09 PROCEDURE — 74177 CT ABD & PELVIS W/CONTRAST: CPT

## 2020-08-09 PROCEDURE — 96375 TX/PRO/DX INJ NEW DRUG ADDON: CPT

## 2020-08-09 PROCEDURE — 76830 TRANSVAGINAL US NON-OB: CPT

## 2020-08-09 PROCEDURE — 83690 ASSAY OF LIPASE: CPT

## 2020-08-09 RX ORDER — KETOROLAC TROMETHAMINE 30 MG/ML
30 INJECTION, SOLUTION INTRAMUSCULAR; INTRAVENOUS
Status: COMPLETED | OUTPATIENT
Start: 2020-08-09 | End: 2020-08-09

## 2020-08-09 RX ORDER — SODIUM CHLORIDE 0.9 % (FLUSH) 0.9 %
5-40 SYRINGE (ML) INJECTION EVERY 8 HOURS
Status: DISCONTINUED | OUTPATIENT
Start: 2020-08-09 | End: 2020-08-10 | Stop reason: HOSPADM

## 2020-08-09 RX ORDER — DIFLUNISAL 500 MG/1
500 TABLET, FILM COATED ORAL 2 TIMES DAILY
Qty: 20 TAB | Refills: 0 | OUTPATIENT
Start: 2020-08-09 | End: 2022-01-17

## 2020-08-09 RX ORDER — HYDROCODONE BITARTRATE AND ACETAMINOPHEN 5; 325 MG/1; MG/1
1 TABLET ORAL
Status: COMPLETED | OUTPATIENT
Start: 2020-08-09 | End: 2020-08-09

## 2020-08-09 RX ORDER — ACETAMINOPHEN 500 MG
1000 TABLET ORAL
Status: COMPLETED | OUTPATIENT
Start: 2020-08-09 | End: 2020-08-09

## 2020-08-09 RX ORDER — ONDANSETRON 2 MG/ML
4 INJECTION INTRAMUSCULAR; INTRAVENOUS
Status: COMPLETED | OUTPATIENT
Start: 2020-08-09 | End: 2020-08-09

## 2020-08-09 RX ORDER — ONDANSETRON 4 MG/1
4 TABLET, ORALLY DISINTEGRATING ORAL
Qty: 15 TAB | Refills: 0 | OUTPATIENT
Start: 2020-08-09 | End: 2022-01-17

## 2020-08-09 RX ORDER — SODIUM CHLORIDE 0.9 % (FLUSH) 0.9 %
5-40 SYRINGE (ML) INJECTION AS NEEDED
Status: DISCONTINUED | OUTPATIENT
Start: 2020-08-09 | End: 2020-08-10 | Stop reason: HOSPADM

## 2020-08-09 RX ORDER — ONDANSETRON 4 MG/1
4 TABLET, ORALLY DISINTEGRATING ORAL
Qty: 15 TAB | Refills: 0 | Status: SHIPPED | OUTPATIENT
Start: 2020-08-09 | End: 2020-08-09

## 2020-08-09 RX ORDER — DIFLUNISAL 500 MG/1
500 TABLET, FILM COATED ORAL 2 TIMES DAILY
Qty: 20 TAB | Refills: 0 | Status: SHIPPED | OUTPATIENT
Start: 2020-08-09 | End: 2020-08-09

## 2020-08-09 RX ADMIN — ONDANSETRON 4 MG: 2 INJECTION INTRAMUSCULAR; INTRAVENOUS at 19:49

## 2020-08-09 RX ADMIN — KETOROLAC TROMETHAMINE 30 MG: 30 INJECTION, SOLUTION INTRAMUSCULAR; INTRAVENOUS at 18:14

## 2020-08-09 RX ADMIN — ACETAMINOPHEN 1000 MG: 500 TABLET ORAL at 22:30

## 2020-08-09 RX ADMIN — HYDROCODONE BITARTRATE AND ACETAMINOPHEN 1 TABLET: 5; 325 TABLET ORAL at 20:12

## 2020-08-09 RX ADMIN — IOPAMIDOL 100 ML: 755 INJECTION, SOLUTION INTRAVENOUS at 18:08

## 2020-08-09 RX ADMIN — ONDANSETRON 4 MG: 2 INJECTION INTRAMUSCULAR; INTRAVENOUS at 18:14

## 2020-08-09 RX ADMIN — Medication 10 ML: at 19:49

## 2020-08-09 RX ADMIN — SODIUM CHLORIDE 1000 ML: 900 INJECTION, SOLUTION INTRAVENOUS at 18:14

## 2020-08-09 NOTE — ED PROVIDER NOTES
EMERGENCY DEPARTMENT HISTORY AND PHYSICAL EXAM    Date: 8/9/2020  Patient Name: Rod Guzman    History of Presenting Illness     Chief Complaint   Patient presents with    Vomiting     pt reports she was seen and treated in this ED yesterday, pt reports worsening of symptoms         History Provided By: Patient    Chief Complaint: abdominal pain  Duration: several days   Timing:  Gradual and Worsening  Location: lower abdomen  Quality: Cramping  Severity: 7 out of 10  Modifying Factors: none  Associated Symptoms: nausea vomiting      HPI: Rod Guzman is a 45 y.o. female with a PMH of HTN anxiety who presents with abdominal pain for the past several days. Patient states he was evaluated in this emergency department yesterday and advised if pain did not subside to return for a CAT scan. Patient states she has been vomiting. Patient states pain radiates to her back. Patient states she had diarrhea 1 time today. Patient endorses she had a partial hysterectomy in 2015. Patient denies abnormal vaginal discharge or vaginal bleeding. She denies fever. PCP: Cuco Roper MD    Current Facility-Administered Medications   Medication Dose Route Frequency Provider Last Rate Last Dose    sodium chloride (NS) flush 5-40 mL  5-40 mL IntraVENous Q8H Silverio Laser, NP   10 mL at 08/09/20 1949    sodium chloride (NS) flush 5-40 mL  5-40 mL IntraVENous PRN Silverio Laser, NP         Current Outpatient Medications   Medication Sig Dispense Refill    diflunisaL (DOLOBID) 500 mg tab Take 1 Tab by mouth two (2) times a day. 20 Tab 0    ondansetron (Zofran ODT) 4 mg disintegrating tablet Take 1 Tab by mouth every eight (8) hours as needed for Nausea or Vomiting. 15 Tab 0    ondansetron (Zofran ODT) 4 mg disintegrating tablet Take 1 Tab by mouth every eight (8) hours as needed for Nausea or Vomiting. 10 Tab 0    dicyclomine (BentyL) 10 mg capsule Take 1 Cap by mouth four (4) times daily for 5 days.  20 Cap 0  acetaZOLAMIDE SR (DIAMOX) 500 mg capsule TAKE 1 CAPSULE BY MOUTH TWICE A DAY  11    butalbital-acetaminophen-caffeine (FIORICET, ESGIC) -40 mg per tablet Take 1 Tab by mouth every six (6) hours as needed for Headache. 21 Tab 0       Past History     Past Medical History:  Past Medical History:   Diagnosis Date    Anxiety     Hypertension     Other ill-defined conditions(799.89)     Hydrocephalus - mild    Psychiatric disorder     Anxiety       Past Surgical History:  Past Surgical History:   Procedure Laterality Date    HX  SECTION  ,03    x2    HX HEENT      LP to relieve right eye pressure    HX HYSTERECTOMY      Partial    HX LAPAROSCOPIC SUPRACERVICAL HYSTERECTOMY  14    HX OTHER SURGICAL  2013    [employer]    HX TUBAL LIGATION         Family History:  Family History   Problem Relation Age of Onset    Diabetes Maternal Grandmother     Stroke Maternal Grandmother        Social History:  Social History     Tobacco Use    Smoking status: Former Smoker     Last attempt to quit: 3/27/2012     Years since quittin.3    Smokeless tobacco: Never Used    Tobacco comment: two times weekly   Substance Use Topics    Alcohol use: Yes     Comment: 4/month    Drug use: Yes     Types: Marijuana       Allergies:  No Known Allergies      Review of Systems   Review of Systems   Constitutional: Negative for fatigue and fever. Respiratory: Negative for shortness of breath and wheezing. Cardiovascular: Negative for chest pain and palpitations. Gastrointestinal: Positive for abdominal pain, diarrhea, nausea and vomiting. Musculoskeletal: Negative for arthralgias, myalgias, neck pain and neck stiffness. Skin: Negative for pallor and rash. Neurological: Negative for dizziness, tremors, weakness and headaches. Hematological: Negative for adenopathy. Psychiatric/Behavioral: Negative for agitation and behavioral problems.    All other systems reviewed and are negative. Physical Exam     Vitals:    08/09/20 2057 08/09/20 2100 08/09/20 2200 08/09/20 2229   BP: 108/72 109/64 104/80    Pulse:    66   Resp:    16   Temp:       SpO2:  100% 100% 100%   Weight:       Height:         Physical Exam  Vitals signs and nursing note reviewed. Constitutional:       General: She is not in acute distress. Appearance: She is well-developed. HENT:      Head: Normocephalic and atraumatic. Right Ear: External ear normal.      Left Ear: External ear normal.      Nose: Nose normal.   Eyes:      Conjunctiva/sclera: Conjunctivae normal.   Neck:      Musculoskeletal: Normal range of motion and neck supple. Cardiovascular:      Rate and Rhythm: Normal rate and regular rhythm. Heart sounds: Normal heart sounds. Pulmonary:      Effort: Pulmonary effort is normal. No respiratory distress. Breath sounds: Normal breath sounds. No wheezing. Abdominal:      General: Bowel sounds are normal.      Palpations: Abdomen is soft. Tenderness: There is abdominal tenderness (RLQ LLQ). Musculoskeletal: Normal range of motion. Lymphadenopathy:      Cervical: No cervical adenopathy. Skin:     General: Skin is warm and dry. Findings: No rash. Neurological:      Mental Status: She is alert and oriented to person, place, and time. Cranial Nerves: No cranial nerve deficit. Coordination: Coordination normal.   Psychiatric:         Behavior: Behavior normal.         Thought Content:  Thought content normal.         Judgment: Judgment normal.           Diagnostic Study Results     Labs -     Recent Results (from the past 12 hour(s))   CBC WITH AUTOMATED DIFF    Collection Time: 08/09/20  5:51 PM   Result Value Ref Range    WBC 6.2 3.6 - 11.0 K/uL    RBC 4.44 3.80 - 5.20 M/uL    HGB 13.5 11.5 - 16.0 g/dL    HCT 42.0 35.0 - 47.0 %    MCV 94.6 80.0 - 99.0 FL    MCH 30.4 26.0 - 34.0 PG    MCHC 32.1 30.0 - 36.5 g/dL    RDW 13.2 11.5 - 14.5 %    PLATELET 780 242 - 695 K/uL    MPV 11.6 8.9 - 12.9 FL    NRBC 0.0 0  WBC    ABSOLUTE NRBC 0.00 0.00 - 0.01 K/uL    NEUTROPHILS 62 32 - 75 %    LYMPHOCYTES 29 12 - 49 %    MONOCYTES 7 5 - 13 %    EOSINOPHILS 1 0 - 7 %    BASOPHILS 1 0 - 1 %    IMMATURE GRANULOCYTES 0 0.0 - 0.5 %    ABS. NEUTROPHILS 3.8 1.8 - 8.0 K/UL    ABS. LYMPHOCYTES 1.8 0.8 - 3.5 K/UL    ABS. MONOCYTES 0.4 0.0 - 1.0 K/UL    ABS. EOSINOPHILS 0.1 0.0 - 0.4 K/UL    ABS. BASOPHILS 0.0 0.0 - 0.1 K/UL    ABS. IMM. GRANS. 0.0 0.00 - 0.04 K/UL    DF AUTOMATED     METABOLIC PANEL, COMPREHENSIVE    Collection Time: 08/09/20  5:51 PM   Result Value Ref Range    Sodium 141 136 - 145 mmol/L    Potassium 3.7 3.5 - 5.1 mmol/L    Chloride 107 97 - 108 mmol/L    CO2 25 21 - 32 mmol/L    Anion gap 9 5 - 15 mmol/L    Glucose 75 65 - 100 mg/dL    BUN 9 6 - 20 MG/DL    Creatinine 1.09 (H) 0.55 - 1.02 MG/DL    BUN/Creatinine ratio 8 (L) 12 - 20      GFR est AA >60 >60 ml/min/1.73m2    GFR est non-AA 56 (L) >60 ml/min/1.73m2    Calcium 8.6 8.5 - 10.1 MG/DL    Bilirubin, total 0.6 0.2 - 1.0 MG/DL    ALT (SGPT) 15 12 - 78 U/L    AST (SGOT) 10 (L) 15 - 37 U/L    Alk.  phosphatase 50 45 - 117 U/L    Protein, total 7.5 6.4 - 8.2 g/dL    Albumin 3.9 3.5 - 5.0 g/dL    Globulin 3.6 2.0 - 4.0 g/dL    A-G Ratio 1.1 1.1 - 2.2     LIPASE    Collection Time: 08/09/20  5:51 PM   Result Value Ref Range    Lipase 50 (L) 73 - 393 U/L   LACTIC ACID    Collection Time: 08/09/20  5:51 PM   Result Value Ref Range    Lactic acid 1.2 0.4 - 2.0 MMOL/L   URINALYSIS W/ REFLEX CULTURE    Collection Time: 08/09/20  6:42 PM    Specimen: Urine   Result Value Ref Range    Color YELLOW/STRAW      Appearance CLEAR CLEAR      Specific gravity 1.020 1.003 - 1.030      pH (UA) 5.5 5.0 - 8.0      Protein Negative NEG mg/dL    Glucose Negative NEG mg/dL    Ketone Negative NEG mg/dL    Bilirubin Negative NEG      Blood Negative NEG      Urobilinogen 1.0 0.2 - 1.0 EU/dL    Nitrites Negative NEG      Leukocyte Esterase Negative NEG      WBC 0-4 0 - 4 /hpf    RBC 0-5 0 - 5 /hpf    Epithelial cells MODERATE (A) FEW /lpf    Bacteria Negative NEG /hpf    UA:UC IF INDICATED CULTURE NOT INDICATED BY UA RESULT CNI      Mucus 3+ (A) NEG /lpf       Radiologic Studies -   US TRANSVAGINAL   Final Result   IMPRESSION:   No visualized pelvic mass or free fluid. Nonvisualization of the ovaries. US PELV NON OBS   Final Result   IMPRESSION:   No visualized pelvic mass or free fluid. Nonvisualization of the ovaries. CT ABD PELV W CONT   Final Result   IMPRESSION:   No acute intra-abdominal pathology. CT Results  (Last 48 hours)               08/09/20 1816  CT ABD PELV W CONT Final result    Impression:  IMPRESSION:   No acute intra-abdominal pathology. Narrative:  EXAM: CT ABD PELV W CONT       INDICATION: severe RUQ  and back pain       COMPARISON: No comparisons        CONTRAST: 100 mL of Isovue-370. TECHNIQUE:    Following the uneventful intravenous administration of contrast, thin axial   images were obtained through the abdomen and pelvis. Coronal and sagittal   reconstructions were generated. Oral contrast was not administered. CT dose   reduction was achieved through use of a standardized protocol tailored for this   examination and automatic exposure control for dose modulation. FINDINGS:    LOWER THORAX: No significant abnormality in the incidentally imaged lower chest.   LIVER: No mass. BILIARY TREE: Gallbladder is within normal limits. CBD is not dilated. SPLEEN: within normal limits. PANCREAS: No mass or ductal dilatation. ADRENALS: Unremarkable. KIDNEYS: No mass, calculus, or hydronephrosis. STOMACH: Unremarkable. SMALL BOWEL: No dilatation or wall thickening. COLON: No dilatation or wall thickening. APPENDIX: Unremarkable. PERITONEUM: Small amount of free fluid in the pelvis. RETROPERITONEUM: No lymphadenopathy or aortic aneurysm.    REPRODUCTIVE ORGANS: Unremarkable URINARY BLADDER: No mass or calculus. BONES: No destructive bone lesion. ABDOMINAL WALL: No mass or hernia. ADDITIONAL COMMENTS: N/A               CXR Results  (Last 48 hours)    None            Medical Decision Making   I am the first provider for this patient. I reviewed the vital signs, available nursing notes, past medical history, past surgical history, family history and social history. Vital Signs-Reviewed the patient's vital signs. Records Reviewed: Nursing Notes    ED Course as of Aug 09 2243   Astrid Puga Aug 09, 2020   1918 Attending has discussed with patient results of CT. Patient states she continues to have pain. Attending is now talking with patient. Ultrasound will be ordered. Dr. Mahin Quiroz radiology ,reviewed results of CT scan  with attending. ?? Ruptured follicle  Ultrasound ordered rule out ovarian torsion.    [AN]      ED Course User Index  [AN] Toshia Fairchild NP          Disposition:  Home  Discussed results of ultrasound with patient advised patient to follow-up with gynecology. Will discharge home with Dolobid and Zofran. DISCHARGE NOTE:         Care plan outlined and precautions discussed. Patient has no new complaints, changes, or physical findings. Results of tests were reviewed with the patient. All medications were reviewed with the patient; will d/c home with dolobid zofran. All of pt's questions and concerns were addressed. Patient was instructed and agrees to follow up with GYN, as well as to return to the ED upon further deterioration. Patient is ready to go home.     Follow-up Information     Follow up With Specialties Details Why [address]  In 3 days  [address] [facility] [address]  [phone]          Discharge Medication List as of 8/9/2020 10:26 PM      START taking these medications    Details   diflunisaL (DOLOBID) 500 mg tab Take 1 Tab by mouth two (2) times a day., Normal, Disp-20 Tab,R-0      !! ondansetron (Zofran ODT) 4 mg disintegrating tablet Take 1 Tab by mouth every eight (8) hours as needed for Nausea or Vomiting., Normal, Disp-15 Tab,R-0       !! - Potential duplicate medications found. Please discuss with provider. CONTINUE these medications which have NOT CHANGED    Details   !! ondansetron (Zofran ODT) 4 mg disintegrating tablet Take 1 Tab by mouth every eight (8) hours as needed for Nausea or Vomiting., Normal, Disp-10 Tab,R-0      dicyclomine (BentyL) 10 mg capsule Take 1 Cap by mouth four (4) times daily for 5 days. , Normal, Disp-20 Cap,R-0      acetaZOLAMIDE SR (DIAMOX) 500 mg capsule TAKE 1 CAPSULE BY MOUTH TWICE A DAY, Historical Med, R-11      butalbital-acetaminophen-caffeine (FIORICET, ESGIC) -40 mg per tablet Take 1 Tab by mouth every six (6) hours as needed for Headache., Normal, Disp-20 Tab, R-0       !! - Potential duplicate medications found. Please discuss with provider. Provider Notes (Medical Decision Making):   DDX ruptured ovarian cyst ovarian torsion ruptured follicle kidney stone  Diverticulitis appendicitis   procedures:  Procedures    Please note that this dictation was completed with Dragon, computer voice recognition software. Quite often unanticipated grammatical, syntax, homophones, and other interpretive errors are inadvertently transcribed by the computer software. Please disregard these errors. Additionally, please excuse any errors that have escaped final proofreading. Diagnosis     Clinical Impression:   1.  Abdominal pain, right lower quadrant

## 2020-08-09 NOTE — ED NOTES
Patient presents to ED with c/o abdominal pain and vomiting. Patient was seen here yesterday for the same issue and pain continues to increase. Patient is alert and oriented x 4 and in no acute distress at this time. Respirations are at a regular rate, depth, and pattern. Patient updated on plan of care and has no questions or concerns at this time. Call bell within reach. Will continue to monitor. Please reference nursing assessment. Emergency Department Nursing Plan of Care       The Nursing Plan of Care is developed from the Nursing assessment and Emergency Department Attending provider initial evaluation. The plan of care may be reviewed in the ED Provider note.     The Plan of Care was developed with the following considerations:   Patient / Family readiness to learn indicated by:verbalized understanding and successful return demonstration  Persons(s) to be included in education: patient  Barriers to Learning/Limitations:No    Signed     Agueda Arriola RN    8/9/2020   5:57 PM

## 2020-08-10 ENCOUNTER — PATIENT OUTREACH (OUTPATIENT)
Dept: CASE MANAGEMENT | Age: 40
End: 2020-08-10

## 2020-08-10 NOTE — ED NOTES
Pt resting in bed and appears to be in no distress, pt is requesting more pain medicine, provider notified.

## 2020-08-10 NOTE — PROGRESS NOTES
Patient contacted regarding recent discharge and COVID-19 risk. Discussed COVID-19 related testing which was not done at this time. Test results were not done. Care Transition Nurse/ Ambulatory Care Manager contacted the patient by telephone to perform post discharge assessment. Verified name and  with patient as identifiers. Patient reports she continues to have abdominal pain. She currently reports 6/10 pain. She has picked up her prescriptions given at the ED and took pain medication just prior to call. Per patient, she is to follow up with gynecology at the [facility] and is waiting for a call back with appt. Patient is encouraged to also follow up with her PCP. Patient states she will need to go to the [facility] and be seen or go back to the ED. She reports her pain is related to a ovarian cyst and she needs to get it checked out. [place] is hurried and patient ends call abruptly. Patient has following risk factors of: no known risk factors. CTN/ACM reviewed discharge instructions, medical action plan and red flags related to discharge diagnosis. Reviewed and educated them on any new and changed medications related to discharge diagnosis. Attempted but was unable to provide COVID education to patient. Patient/family/caregiver given information for [employer] and agrees to enroll no    Plan for follow-up call in 7-14 days based on severity of symptoms and risk factors.

## 2020-08-10 NOTE — ED NOTES
Verbal shift change report given to Cassie Abbasi RN (oncoming nurse) by Shweta Dent RN (offgoing nurse). Report included the following information SBAR, ED Summary, MAR and Recent Results.

## 2020-08-10 NOTE — ED NOTES
Patient (s) was given copy of dc instructions and no paper script(s) and two electronic scripts. Patient (s) has verbalized understanding of instructions and script (s). Patient was given a current medication reconciliation form and verbalized understanding of their medications. Patient (s) has verbalized understanding of the importance of discussing medications with  his or her physician or clinic they will be following up with. Patient alert and oriented and in no acute distress. Patient offered wheelchair from treatment area to hospital entrance, patient declined wheelchair. Patient left ED with self, pt reports her sister is picking her up from the ED lobby.

## 2020-08-10 NOTE — DISCHARGE INSTRUCTIONS

## 2021-07-22 ENCOUNTER — HOSPITAL ENCOUNTER (EMERGENCY)
Age: 41
Discharge: HOME | End: 2021-07-22
Attending: EMERGENCY MEDICINE
Payer: MEDICAID

## 2021-07-22 VITALS
TEMPERATURE: 98.7 F | RESPIRATION RATE: 18 BRPM | DIASTOLIC BLOOD PRESSURE: 60 MMHG | OXYGEN SATURATION: 98 % | WEIGHT: 241 LBS | HEART RATE: 79 BPM | SYSTOLIC BLOOD PRESSURE: 113 MMHG | BODY MASS INDEX: 44.35 KG/M2 | HEIGHT: 62 IN

## 2021-07-22 DIAGNOSIS — R10.9 ABDOMINAL CRAMPING: ICD-10-CM

## 2021-07-22 DIAGNOSIS — K92.2 ACUTE LOWER GI BLEEDING: Primary | ICD-10-CM

## 2021-07-22 DIAGNOSIS — R11.10 ACUTE VOMITING: ICD-10-CM

## 2021-07-22 DIAGNOSIS — K64.4 EXTERNAL HEMORRHOID: ICD-10-CM

## 2021-07-22 LAB
ALBUMIN SERPL-MCNC: 3.5 G/DL (ref 3.5–5)
ALBUMIN/GLOB SERPL: 1.1 {RATIO} (ref 1.1–2.2)
ALP SERPL-CCNC: 47 U/L (ref 45–117)
ALT SERPL-CCNC: 18 U/L (ref 12–78)
ANION GAP SERPL CALC-SCNC: 10 MMOL/L (ref 5–15)
APPEARANCE UR: CLEAR
AST SERPL-CCNC: 14 U/L (ref 15–37)
BACTERIA URNS QL MICRO: NEGATIVE /HPF
BASOPHILS # BLD: 0 K/UL (ref 0–0.1)
BASOPHILS NFR BLD: 1 % (ref 0–1)
BILIRUB SERPL-MCNC: 0.6 MG/DL (ref 0.2–1)
BILIRUB UR QL: NEGATIVE
BUN SERPL-MCNC: 6 MG/DL (ref 6–20)
BUN/CREAT SERPL: 9 (ref 12–20)
CALCIUM SERPL-MCNC: 8.3 MG/DL (ref 8.5–10.1)
CHLORIDE SERPL-SCNC: 109 MMOL/L (ref 97–108)
CO2 SERPL-SCNC: 25 MMOL/L (ref 21–32)
COLOR UR: NORMAL
CREAT SERPL-MCNC: 0.7 MG/DL (ref 0.55–1.02)
DIFFERENTIAL METHOD BLD: NORMAL
EOSINOPHIL # BLD: 0 K/UL (ref 0–0.4)
EOSINOPHIL NFR BLD: 1 % (ref 0–7)
EPITH CASTS URNS QL MICRO: NORMAL /LPF
ERYTHROCYTE [DISTWIDTH] IN BLOOD BY AUTOMATED COUNT: 12.4 % (ref 11.5–14.5)
GLOBULIN SER CALC-MCNC: 3.3 G/DL (ref 2–4)
GLUCOSE SERPL-MCNC: 85 MG/DL (ref 65–100)
GLUCOSE UR STRIP.AUTO-MCNC: NEGATIVE MG/DL
HCT VFR BLD AUTO: 38.3 % (ref 35–47)
HEMOCCULT STL QL: POSITIVE
HGB BLD-MCNC: 12.5 G/DL (ref 11.5–16)
HGB UR QL STRIP: NEGATIVE
IMM GRANULOCYTES # BLD AUTO: 0 K/UL (ref 0–0.04)
IMM GRANULOCYTES NFR BLD AUTO: 0 % (ref 0–0.5)
KETONES UR QL STRIP.AUTO: NEGATIVE MG/DL
LEUKOCYTE ESTERASE UR QL STRIP.AUTO: NEGATIVE
LYMPHOCYTES # BLD: 1.9 K/UL (ref 0.8–3.5)
LYMPHOCYTES NFR BLD: 30 % (ref 12–49)
MCH RBC QN AUTO: 30.8 PG (ref 26–34)
MCHC RBC AUTO-ENTMCNC: 32.6 G/DL (ref 30–36.5)
MCV RBC AUTO: 94.3 FL (ref 80–99)
MONOCYTES # BLD: 0.3 K/UL (ref 0–1)
MONOCYTES NFR BLD: 5 % (ref 5–13)
NEUTS SEG # BLD: 4.1 K/UL (ref 1.8–8)
NEUTS SEG NFR BLD: 63 % (ref 32–75)
NITRITE UR QL STRIP.AUTO: NEGATIVE
NRBC # BLD: 0 K/UL (ref 0–0.01)
NRBC BLD-RTO: 0 PER 100 WBC
PH UR STRIP: 6 [PH] (ref 5–8)
PLATELET # BLD AUTO: 216 K/UL (ref 150–400)
PMV BLD AUTO: 11.1 FL (ref 8.9–12.9)
POTASSIUM SERPL-SCNC: 4.2 MMOL/L (ref 3.5–5.1)
PROT SERPL-MCNC: 6.8 G/DL (ref 6.4–8.2)
PROT UR STRIP-MCNC: NEGATIVE MG/DL
RBC # BLD AUTO: 4.06 M/UL (ref 3.8–5.2)
RBC #/AREA URNS HPF: NORMAL /HPF (ref 0–5)
SODIUM SERPL-SCNC: 144 MMOL/L (ref 136–145)
SP GR UR REFRACTOMETRY: 1.01 (ref 1–1.03)
UA: UC IF INDICATED,UAUC: NORMAL
UROBILINOGEN UR QL STRIP.AUTO: 1 EU/DL (ref 0.2–1)
WBC # BLD AUTO: 6.4 K/UL (ref 3.6–11)
WBC URNS QL MICRO: NORMAL /HPF (ref 0–4)

## 2021-07-22 PROCEDURE — 74011000250 HC RX REV CODE- 250: Performed by: EMERGENCY MEDICINE

## 2021-07-22 PROCEDURE — 74011250637 HC RX REV CODE- 250/637: Performed by: EMERGENCY MEDICINE

## 2021-07-22 PROCEDURE — 82272 OCCULT BLD FECES 1-3 TESTS: CPT

## 2021-07-22 PROCEDURE — 36415 COLL VENOUS BLD VENIPUNCTURE: CPT

## 2021-07-22 PROCEDURE — 81001 URINALYSIS AUTO W/SCOPE: CPT

## 2021-07-22 PROCEDURE — 80053 COMPREHEN METABOLIC PANEL: CPT

## 2021-07-22 PROCEDURE — 85025 COMPLETE CBC W/AUTO DIFF WBC: CPT

## 2021-07-22 PROCEDURE — 99283 EMERGENCY DEPT VISIT LOW MDM: CPT

## 2021-07-22 RX ORDER — DICYCLOMINE HYDROCHLORIDE 20 MG/1
20 TABLET ORAL EVERY 6 HOURS
Qty: 20 TABLET | Refills: 0 | Status: SHIPPED | OUTPATIENT
Start: 2021-07-22 | End: 2021-07-22 | Stop reason: SDUPTHER

## 2021-07-22 RX ORDER — HYDROCORTISONE 25 MG/G
CREAM TOPICAL 4 TIMES DAILY
Qty: 30 G | Refills: 0 | Status: SHIPPED | OUTPATIENT
Start: 2021-07-22 | End: 2021-07-22 | Stop reason: SDUPTHER

## 2021-07-22 RX ORDER — DICYCLOMINE HYDROCHLORIDE 10 MG/1
10 CAPSULE ORAL
Status: COMPLETED | OUTPATIENT
Start: 2021-07-22 | End: 2021-07-22

## 2021-07-22 RX ORDER — LIDOCAINE HYDROCHLORIDE 20 MG/ML
JELLY TOPICAL
Status: COMPLETED | OUTPATIENT
Start: 2021-07-22 | End: 2021-07-22

## 2021-07-22 RX ORDER — DICYCLOMINE HYDROCHLORIDE 20 MG/1
20 TABLET ORAL EVERY 6 HOURS
Qty: 20 TABLET | Refills: 0 | Status: SHIPPED | OUTPATIENT
Start: 2021-07-22 | End: 2022-01-20

## 2021-07-22 RX ORDER — HYDROCORTISONE 25 MG/G
CREAM TOPICAL 4 TIMES DAILY
Qty: 30 G | Refills: 0 | OUTPATIENT
Start: 2021-07-22 | End: 2022-01-17

## 2021-07-22 RX ADMIN — LIDOCAINE HYDROCHLORIDE: 20 JELLY TOPICAL at 13:40

## 2021-07-22 RX ADMIN — DICYCLOMINE HYDROCHLORIDE 10 MG: 10 CAPSULE ORAL at 13:39

## 2021-07-22 NOTE — ED NOTES
Emergency Department Nursing Plan of Care       The Nursing Plan of Care is developed from the Nursing assessment and Emergency Department Attending provider initial evaluation. The plan of care may be reviewed in the ED Provider note.     The Plan of Care was developed with the following considerations:   Patient / Family readiness to learn indicated by:verbalized understanding  Persons(s) to be included in education: patient  Barriers to Learning/Limitations:No    Signed     Becca Sheppard RN    7/22/2021   1:27 PM

## 2021-07-22 NOTE — DISCHARGE INSTRUCTIONS
Luis Carlos Amos, it was a pleasure taking care of you at [facility] Emergency Department today. We know that when you come to [facility], you are entrusting us with your health, comfort, and safety. Our physicians and nurses honor that trust, and we truly appreciate the opportunity to care for you and your loved ones. We also VALUE your feedback. If you receive a survey about your Emergency Department experience today, please fill it out. We care about our patients' feedback, and we listen to what you have to say. Thank you!

## 2021-07-22 NOTE — LETTER
[address] EMERGENCY DEPT  [address] [facility] [id number] [phone]    Work/School Note    Date: 7/22/2021    To Whom It May concern:    Jane López was seen and treated today in the emergency room by the following provider(s):  Attending Provider: Mikie Grier MD.      Jane López may return to work on 7/23/21    Sincerely,          Augustine Ordaz MD

## 2021-07-22 NOTE — ED TRIAGE NOTES
Pt presents to ED with c/o rectal bleeding beginning yesterday. Pt denies hx GI bleed. Pt states upon waking today she had one emesis episode and lower back pain. Pt denies taking medication for symptoms.

## 2021-07-22 NOTE — ED PROVIDER NOTES
EMERGENCY DEPARTMENT HISTORY AND PHYSICAL EXAM      Please note that this dictation was completed with the assistance of \"Dragon\", the computer voice recognition software. Quite often unanticipated grammatical, syntax, homophones, and other interpretive errors are inadvertently transcribed by the computer software. Please disregard these errors and any errors that have escaped final proofreading. Thank you. Patient Name: Iggy Mathew  : 1980  MRN: [id number]  History of Presenting Illness     Chief Complaint   Patient presents with    Rectal Bleeding     beginning yesterday with lower back pain     History Provided By: Patient    HPI: Iggy Mathew, 37 y.o. female with past medical history as documented below presents to the ED with c/o of acute onset of rectal bleeding onset since yesterday morning. Pt states she noticed bright red blood per rectum yesterday and she continued to notice blood throughout the day, intermittent and scant. This morning, she had one episode of emesis, nonbloody and nonbilious. She reports associated mild lower back pain and lower abdominal cramping. Denies aspirin use, chronic anticoagulation use. Denies hx of chronic GI problems. She also had another episode of bloody stools, scant, with associated small clots. Pt denies any other exacerbating or ameliorating factors. Additionally, pt specifically denies any recent fever, chills, headache, CP, SOB, lightheadedness, dizziness, numbness, weakness, lower extremity swelling, heart palpitations, urinary sxs, diarrhea, constipation, cough, or congestion. There are no other complaints, changes or physical findings pertinent to the HPI at this time.     PCP: Mack Nolan MD    Past History   Past Medical History:  Past Medical History:   Diagnosis Date    Anxiety     Hypertension     Other ill-defined conditions(939.89)     Hydrocephalus - mild    Psychiatric disorder     Anxiety       Past Surgical History:  Past Surgical History:   Procedure Laterality Date    HX  SECTION  00,03    x2    HX HEENT      LP to relieve right eye pressure    HX HYSTERECTOMY      Partial    HX LAPAROSCOPIC SUPRACERVICAL HYSTERECTOMY  14    HX OTHER SURGICAL  2013    [employer]    HX TUBAL LIGATION         Family History:  Family History   Problem Relation Age of Onset    Diabetes Maternal Grandmother     Stroke Maternal Grandmother        Social History:  Social History     Tobacco Use    Smoking status: Current Some Day Smoker    Smokeless tobacco: Never Used    Tobacco comment: two times weekly   Substance Use Topics    Alcohol use: Yes     Comment: occasional    Drug use: Yes     Types: Marijuana       Allergies:  No Known Allergies    Current Medications:  No current facility-administered medications on file prior to encounter. Current Outpatient Medications on File Prior to Encounter   Medication Sig Dispense Refill    diflunisaL (DOLOBID) 500 mg tab Take 1 Tab by mouth two (2) times a day. 20 Tab 0    ondansetron (Zofran ODT) 4 mg disintegrating tablet Take 1 Tab by mouth every eight (8) hours as needed for Nausea or Vomiting. 15 Tab 0    ondansetron (Zofran ODT) 4 mg disintegrating tablet Take 1 Tab by mouth every eight (8) hours as needed for Nausea or Vomiting. 10 Tab 0    acetaZOLAMIDE SR (DIAMOX) 500 mg capsule TAKE 1 CAPSULE BY MOUTH TWICE A DAY  11    butalbital-acetaminophen-caffeine (FIORICET, ESGIC) -40 mg per tablet Take 1 Tab by mouth every six (6) hours as needed for Headache. 20 Tab 0     Review of Systems   A complete ROS was reviewed by me today and was negative, unless otherwise specified below:  Review of Systems   Constitutional: Negative. Negative for chills and fever. HENT: Negative. Negative for congestion and sore throat. Eyes: Negative. Respiratory: Negative. Negative for cough, chest tightness, shortness of breath and wheezing. Cardiovascular: Negative. Negative for chest pain, palpitations and leg swelling. Gastrointestinal: Positive for abdominal pain, blood in stool, nausea and vomiting. Negative for abdominal distention, constipation and diarrhea. Endocrine: Negative. Genitourinary: Negative. Negative for dysuria, flank pain, frequency, hematuria and urgency. Musculoskeletal: Negative. Negative for arthralgias, back pain and myalgias. Skin: Negative. Negative for color change and rash. Neurological: Negative. Negative for dizziness, syncope, speech difficulty, weakness, light-headedness, numbness and headaches. Hematological: Negative. Psychiatric/Behavioral: Negative. Negative for confusion and self-injury. The patient is not nervous/anxious. All other systems reviewed and are negative. Physical Exam   Physical Exam  Vitals and nursing note reviewed. Exam conducted with a chaperone present. Constitutional:       General: She is not in acute distress. Appearance: She is well-developed. She is not diaphoretic. HENT:      Head: Normocephalic and atraumatic. Mouth/Throat:      Pharynx: No oropharyngeal exudate. Eyes:      Conjunctiva/sclera: Conjunctivae normal.   Cardiovascular:      Rate and Rhythm: Normal rate and regular rhythm. Heart sounds: Normal heart sounds. Pulmonary:      Effort: Pulmonary effort is normal. No respiratory distress. Breath sounds: Normal breath sounds. No wheezing or rales. Chest:      Chest wall: No tenderness. Abdominal:      General: Bowel sounds are normal. There is no distension. Palpations: Abdomen is soft. There is no mass. Tenderness: There is no abdominal tenderness. There is no guarding or rebound. Musculoskeletal:         General: Normal range of motion. Cervical back: Normal range of motion. Skin:     General: Skin is warm. Neurological:      Mental Status: She is alert and oriented to person, place, and time.       Cranial Nerves: No cranial nerve deficit. Motor: No abnormal muscle tone. Diagnostic Study Results     Labs -   I have personally reviewed and interpreted all available laboratory results. Recent Results (from the past 24 hour(s))   CBC WITH AUTOMATED DIFF    Collection Time: 07/22/21  1:26 PM   Result Value Ref Range    WBC 6.4 3.6 - 11.0 K/uL    RBC 4.06 3.80 - 5.20 M/uL    HGB 12.5 11.5 - 16.0 g/dL    HCT 38.3 35.0 - 47.0 %    MCV 94.3 80.0 - 99.0 FL    MCH 30.8 26.0 - 34.0 PG    MCHC 32.6 30.0 - 36.5 g/dL    RDW 12.4 11.5 - 14.5 %    PLATELET 721 185 - 093 K/uL    MPV 11.1 8.9 - 12.9 FL    NRBC 0.0 0  WBC    ABSOLUTE NRBC 0.00 0.00 - 0.01 K/uL    NEUTROPHILS 63 32 - 75 %    LYMPHOCYTES 30 12 - 49 %    MONOCYTES 5 5 - 13 %    EOSINOPHILS 1 0 - 7 %    BASOPHILS 1 0 - 1 %    IMMATURE GRANULOCYTES 0 0.0 - 0.5 %    ABS. NEUTROPHILS 4.1 1.8 - 8.0 K/UL    ABS. LYMPHOCYTES 1.9 0.8 - 3.5 K/UL    ABS. MONOCYTES 0.3 0.0 - 1.0 K/UL    ABS. EOSINOPHILS 0.0 0.0 - 0.4 K/UL    ABS. BASOPHILS 0.0 0.0 - 0.1 K/UL    ABS. IMM. GRANS. 0.0 0.00 - 0.04 K/UL    DF AUTOMATED     METABOLIC PANEL, COMPREHENSIVE    Collection Time: 07/22/21  1:26 PM   Result Value Ref Range    Sodium 144 136 - 145 mmol/L    Potassium 4.2 3.5 - 5.1 mmol/L    Chloride 109 (H) 97 - 108 mmol/L    CO2 25 21 - 32 mmol/L    Anion gap 10 5 - 15 mmol/L    Glucose 85 65 - 100 mg/dL    BUN 6 6 - 20 MG/DL    Creatinine 0.70 0.55 - 1.02 MG/DL    BUN/Creatinine ratio 9 (L) 12 - 20      GFR est AA >60 >60 ml/min/1.73m2    GFR est non-AA >60 >60 ml/min/1.73m2    Calcium 8.3 (L) 8.5 - 10.1 MG/DL    Bilirubin, total 0.6 0.2 - 1.0 MG/DL    ALT (SGPT) 18 12 - 78 U/L    AST (SGOT) 14 (L) 15 - 37 U/L    Alk.  phosphatase 47 45 - 117 U/L    Protein, total 6.8 6.4 - 8.2 g/dL    Albumin 3.5 3.5 - 5.0 g/dL    Globulin 3.3 2.0 - 4.0 g/dL    A-G Ratio 1.1 1.1 - 2.2     OCCULT BLOOD, STOOL    Collection Time: 07/22/21  1:26 PM   Result Value Ref Range    Occult blood, stool Positive (A) NEG URINALYSIS W/ REFLEX CULTURE    Collection Time: 07/22/21  1:30 PM    Specimen: Urine   Result Value Ref Range    Color YELLOW/STRAW      Appearance CLEAR CLEAR      Specific gravity 1.015 1.003 - 1.030      pH (UA) 6.0 5.0 - 8.0      Protein Negative NEG mg/dL    Glucose Negative NEG mg/dL    Ketone Negative NEG mg/dL    Bilirubin Negative NEG      Blood Negative NEG      Urobilinogen 1.0 0.2 - 1.0 EU/dL    Nitrites Negative NEG      Leukocyte Esterase Negative NEG      WBC 0-4 0 - 4 /hpf    RBC 0-5 0 - 5 /hpf    Epithelial cells FEW FEW /lpf    Bacteria Negative NEG /hpf    UA:UC IF INDICATED CULTURE NOT INDICATED BY UA RESULT CNI         Radiologic Studies -   I have personally reviewed and interpreted all available imaging studies and agree with radiology interpretation and report. No orders to display     CT Results  (Last 48 hours)    None        CXR Results  (Last 48 hours)    None          Medical Decision Making   I reviewed the vital signs, available nursing notes, past medical history, past surgical history, family history and social history. Vital Signs-Reviewed the patient's vital signs. Patient Vitals for the past 24 hrs:   Temp Pulse Resp BP SpO2   07/22/21 1300 98.7 °F (37.1 °C) 79 18 113/60 98 %       Pulse Oximetry Analysis - 98% on RA    Cardiac Monitor:   Rate: 79 bpm  The cardiac monitor revealed the following rhythm as interpreted by me: Normal Sinus Rhythm     The cardiac monitor was ordered secondary to the patient's history of rectal bleeding and monitor the patient for dysrhythmia    Records Reviewed: Nursing Notes, Old Medical Records, Previous electrocardiograms, Previous Radiology Studies and Previous Laboratory Studies    Provider Notes (Medical Decision Making):   Patient presents with lower GI bleeding. Currently with stable vitals and benign abdominal exam.  DDx: AVM, diverticulosis, diverticulitis, IBD, IBS, colitis, hemorrhoids.   Will obtain two large bore IV's, provide IVF hydration, check labs including type and screen, coags, will perform rectal exam and monitor closely for the need for additional imaging. ED Course:   I am the first physician for this patient's ED visit today. Initial assessment performed. I discussed presenting problems, concerns and my formulated plan for today's visit with the patient and any available family members at bedside. I encouraged them to ask questions as they arise throughout the visit. Social History     Tobacco Use    Smoking status: Current Some Day Smoker    Smokeless tobacco: Never Used    Tobacco comment: two times weekly   Substance Use Topics    Alcohol use: Yes     Comment: occasional    Drug use: Yes     Types: Marijuana     TOBACCO COUNSELING:  Upon evaluation, pt expressed that they are a current tobacco user. For approximately 3-5 mins, pt has been counseled on the dangers of smoking and was encouraged to quit as soon as possible in order to decrease further risks to their health. Pt has conveyed their understanding of the risks involved should they continue to use tobacco products. I reviewed our electronic medical record system for any past medical records that were available that may contribute to the patient's current condition, the nursing notes and vital signs from today's visit.       ED Orders Placed :  Orders Placed This Encounter    CBC WITH AUTOMATED DIFF    METABOLIC PANEL, COMPREHENSIVE    URINALYSIS W/ REFLEX CULTURE    OCCULT BLOOD, STOOL    INSERT PERIPHERAL IV ONE TIME STAT    lidocaine (XYLOCAINE) 2 % jelly    dicyclomine (BENTYL) capsule 10 mg    dicyclomine (BENTYL) 20 mg tablet    hydrocortisone (Anusol-HC) 2.5 % rectal cream       ED Medications Administered:  Medications   lidocaine (XYLOCAINE) 2 % jelly ( Topical Given 7/22/21 1340)   dicyclomine (BENTYL) capsule 10 mg (10 mg Oral Given 7/22/21 1339)         Procedure Note - Rectal Exam:   1:20 PM  Performed by: Shanna Toussaint MD  Chaperoned by: Primary RN  Rectal exam performed. Scant bright red blood stool was collected. Stool was collected and sent to the lab for Hemoccult testing. Other findings: external hemorrhoid noted, no active bleeding, no hard mass, no hard stool   The procedure took 1-15 minutes, and pt tolerated well. Progress Note:  I have re-examined the patient. Pt states she feels much better and symptoms improved. Tolerating oral intake. Abdomen is soft and without guarding, rebound or other peritoneal signs. I have discussed with patient the importance of close f/u and to return to the ED if symptoms don't improve or worsen. Progress Note:  Patient has been reassessed and reports feeling better and symptoms have improved significantly after ED treatment. Lor Horn's final labs and imaging have been reviewed with her and available family and/or caregiver. They have been counseled regarding her diagnosis. She verbally conveys understanding and agreement of the signs, symptoms, diagnosis, treatment and prognosis and additionally agrees to follow up as recommended with Dr. Trey Bernal MD and/or specialist in 24 - 48 hours. She also agrees with the care-plan we created together and conveys that all of her questions have been answered. I have also put together a packet of discharge instructions for her that include: 1) educational information regarding their diagnosis, 2) how to care for their diagnosis at home, as well a 3) list of reasons why they would want to return to the ED prior to their follow-up appointment should the patient's condition change or symptoms worsen. I have answered all questions to the patient's satisfaction. Strict return precautions given. She both understood and agreed with plan as discussed. Vital signs stable for discharge. Disposition:   DISCHARGE  The pt is ready for discharge.  The pt's signs, symptoms, diagnosis, and discharge instructions have been discussed and pt has conveyed their understanding. The pt is to follow up as recommended or return to ER should their symptoms worsen. Plan has been discussed and pt is in agreement. Plan:  1. Return precautions as discussed with patient and available family and/or caregiver. 2.   Discharge Medication List as of 7/22/2021  2:23 PM      START taking these medications    Details   dicyclomine (BENTYL) 20 mg tablet Take 1 Tablet by mouth every six (6) hours. , Normal, Disp-20 Tablet, R-0      hydrocortisone (Anusol-HC) 2.5 % rectal cream Insert  into rectum four (4) times daily. , Normal, Disp-30 g, R-0         CONTINUE these medications which have NOT CHANGED    Details   diflunisaL (DOLOBID) 500 mg tab Take 1 Tab by mouth two (2) times a day., Normal, Disp-20 Tab,R-0      !! ondansetron (Zofran ODT) 4 mg disintegrating tablet Take 1 Tab by mouth every eight (8) hours as needed for Nausea or Vomiting., Normal, Disp-15 Tab,R-0      !! ondansetron (Zofran ODT) 4 mg disintegrating tablet Take 1 Tab by mouth every eight (8) hours as needed for Nausea or Vomiting., Normal, Disp-10 Tab,R-0      acetaZOLAMIDE SR (DIAMOX) 500 mg capsule TAKE 1 CAPSULE BY MOUTH TWICE A DAY, Historical Med, R-11      butalbital-acetaminophen-caffeine (FIORICET, ESGIC) -40 mg per tablet Take 1 Tab by mouth every six (6) hours as needed for Headache., Normal, Disp-20 Tab, R-0       !! - Potential duplicate medications found. Please discuss with provider. 3.   Follow-up Information     Follow up With Specialties Details Why [facility] EMERGENCY DEPT Emergency Medicine  As needed, If symptoms worsen [address] MD Family Medicine, Sports Medicine  As needed, If symptoms worsen [address]  [phone]            Instructed to return to ED if worse  Diagnosis   Clinical Impression:  1. Acute lower GI bleeding    2. Abdominal cramping    3.  External hemorrhoid    4. Acute vomiting        Attestation:  Claude Crutch, MD, am the attending of record for this patient. I personally performed the services described in this documentation on this date, 7/22/2021 for patient, Jane López. I have reviewed the chart and verified that the record is accurate and complete.

## 2021-07-22 NOTE — ED NOTES
RN chaperoned by [facility] (ed tech) during application of lidocaine jelly

## 2022-01-17 ENCOUNTER — HOSPITAL ENCOUNTER (EMERGENCY)
Age: 42
Discharge: HOME | End: 2022-01-17
Attending: EMERGENCY MEDICINE | Admitting: EMERGENCY MEDICINE
Payer: MEDICARE

## 2022-01-17 VITALS
TEMPERATURE: 98.1 F | DIASTOLIC BLOOD PRESSURE: 99 MMHG | HEART RATE: 103 BPM | OXYGEN SATURATION: 100 % | HEIGHT: 62 IN | WEIGHT: 241 LBS | RESPIRATION RATE: 18 BRPM | BODY MASS INDEX: 44.35 KG/M2 | SYSTOLIC BLOOD PRESSURE: 150 MMHG

## 2022-01-17 DIAGNOSIS — K04.7 DENTAL INFECTION: Primary | ICD-10-CM

## 2022-01-17 PROCEDURE — 99283 EMERGENCY DEPT VISIT LOW MDM: CPT

## 2022-01-17 RX ORDER — IBUPROFEN 800 MG/1
800 TABLET ORAL
Qty: 20 TABLET | Refills: 0 | Status: SHIPPED | OUTPATIENT
Start: 2022-01-17 | End: 2022-01-20

## 2022-01-17 RX ORDER — PENICILLIN V POTASSIUM 500 MG/1
500 TABLET, FILM COATED ORAL 2 TIMES DAILY
Qty: 14 TABLET | Refills: 0 | Status: SHIPPED | OUTPATIENT
Start: 2022-01-17 | End: 2022-01-20

## 2022-01-18 NOTE — ED NOTES

## 2022-01-18 NOTE — DISCHARGE INSTRUCTIONS
You can also follow up with [facility]are[facility] [address] [place] [address],[address]  [phone]       It was a pleasure taking care of you at [facility] Emergency Department today. We know that when you come to [facility], you are entrusting us with your health, comfort, and safety. Our physicians and nurses honor that trust, and we truly appreciate the opportunity to care for you and your loved ones. We also value our feedback. If you receive a survey about your Emergency Department experience today, please fill it out. We care about our patients' feedback, and we listen to what you have to say. Thank you!

## 2022-01-18 NOTE — ED NOTES
Discharge instructions were given to the patient by Bettye Williamson RN. The patient left the Emergency Department ambulatory, alert and oriented and in no acute distress with 2 prescriptions. The patient was encouraged to call or return to the ED for worsening issues or problems and was encouraged to schedule a follow up appointment for continuing care. The patient verbalized understanding of discharge instructions and prescriptions, all questions were answered. The patient has no further concerns at this time.

## 2022-01-18 NOTE — ED PROVIDER NOTES
EMERGENCY DEPARTMENT HISTORY AND PHYSICAL EXAM    Date: 2022  Patient Name: Volodymyr López    History of Presenting Illness     Chief Complaint   Patient presents with    Dental Pain         History Provided By: Patient    HPI: Volodymyr López is a 37 y.o. female with a PMH of Anxiety, HTN who presents with dental pain x 10 days. Located to the L lower gum. Reports being informed she has an abscess per the dentist at [facility] after they obtained x-rays. Denies fever, chills, facial swelling. States she had an appointment with [facility] but it was cancelled without her knowledge. She has not taken anything her pain. PCP: Geraldine Cheng MD    Current Outpatient Medications   Medication Sig Dispense Refill    penicillin v potassium (VEETID) 500 mg tablet Take 1 Tablet by mouth two (2) times a day. 14 Tablet 0    ibuprofen (MOTRIN) 800 mg tablet Take 1 Tablet by mouth every six (6) hours as needed for Pain for up to 7 days. 20 Tablet 0    dicyclomine (BENTYL) 20 mg tablet Take 1 Tablet by mouth every six (6) hours. 20 Tablet 0    acetaZOLAMIDE SR (DIAMOX) 500 mg capsule TAKE 1 CAPSULE BY MOUTH TWICE A DAY  11    butalbital-acetaminophen-caffeine (FIORICET, ESGIC) -40 mg per tablet Take 1 Tab by mouth every six (6) hours as needed for Headache.  20 Tab 0       Past History     Past Medical History:  Past Medical History:   Diagnosis Date    Anxiety     Hypertension     Other ill-defined conditions(799.89)     Hydrocephalus - mild    Psychiatric disorder     Anxiety       Past Surgical History:  Past Surgical History:   Procedure Laterality Date    HX  SECTION  ,03    x2    HX HEENT      LP to relieve right eye pressure    HX HYSTERECTOMY      Partial    HX LAPAROSCOPIC SUPRACERVICAL HYSTERECTOMY  14    HX OTHER SURGICAL  2013    Spinal Tap    HX TUBAL LIGATION         Family History:  Family History   Problem Relation Age of Onset    Diabetes Maternal Grandmother     Stroke Maternal Grandmother        Social History:  Social History     Tobacco Use    Smoking status: Current Some Day Smoker    Smokeless tobacco: Never Used    Tobacco comment: two times weekly   Substance Use Topics    Alcohol use: Yes     Comment: occasional    Drug use: Yes     Types: Marijuana       Allergies:  No Known Allergies      Review of Systems   Review of Systems   Constitutional: Negative for chills and fever. HENT: Positive for dental problem. Negative for congestion, drooling, ear pain, mouth sores and rhinorrhea. Respiratory: Negative for cough. Cardiovascular: Negative for chest pain. Gastrointestinal: Negative for abdominal pain, nausea and vomiting. Musculoskeletal: Negative for arthralgias. Skin: Negative for color change and rash. Neurological: Negative for headaches. All other systems reviewed and are negative. Physical Exam     Vitals:    01/17/22 1856   BP: (!) 150/99   Pulse: (!) 103   Resp: 18   Temp: 98.1 °F (36.7 °C)   SpO2: 100%   Weight: 109.3 kg (241 lb)   Height: 5' 2\" (1.575 m)     Physical Exam  Vitals and nursing note reviewed. Constitutional:       General: She is not in acute distress. Appearance: She is well-developed. She is not ill-appearing. HENT:      Head: Normocephalic and atraumatic. Right Ear: Tympanic membrane, ear canal and external ear normal.      Left Ear: Tympanic membrane, ear canal and external ear normal.      Nose: Nose normal.      Mouth/Throat:      Mouth: Mucous membranes are moist.      Dentition: Abnormal dentition. No gingival swelling or gum lesions. Pharynx: Oropharynx is clear. Uvula midline. No oropharyngeal exudate or posterior oropharyngeal erythema. Eyes:      Extraocular Movements: Extraocular movements intact. Conjunctiva/sclera: Conjunctivae normal.      Pupils: Pupils are equal, round, and reactive to light.    Cardiovascular:      Rate and Rhythm: Normal rate and regular rhythm. Heart sounds: Normal heart sounds. Pulmonary:      Effort: Pulmonary effort is normal.      Breath sounds: Normal breath sounds. Musculoskeletal:      Cervical back: Normal range of motion and neck supple. Neurological:      Mental Status: She is alert and oriented to person, place, and time. Psychiatric:         Thought Content: Thought content normal.           Diagnostic Study Results     Labs -   No results found for this or any previous visit (from the past 12 hour(s)). Radiologic Studies -   No orders to display     CT Results  (Last 48 hours)    None        CXR Results  (Last 48 hours)    None            Medical Decision Making   I am the first provider for this patient. I reviewed the vital signs, available nursing notes, past medical history, past surgical history, family history and social history. Vital Signs-Reviewed the patient's vital signs. Records Reviewed: Nursing Notes and Old Medical Records    Provider Notes (Medical Decision Making):     ED COURSE:   Initial assessment performed. The patients presenting problems have been discussed, and they are in agreement with the care plan formulated and outlined with them. I have encouraged them to ask questions as they arise throughout their visit. DDX: Dental Abscess, Dentalgia, Tooth Avulsion, Dental Caries, periodontal disease               Disposition:  Discharge     DISCHARGE NOTE:         Care plan outlined and precautions discussed. Patient has no new complaints, changes, or physical findings. All of pt's questions and concerns were addressed. Patient was instructed and agrees to follow up with PCP, as well as to return to the ED upon further deterioration. Patient is ready to go home. Follow-up Information     Follow up With Specialties Details Why Contact Info    [facility]    [address]  [address] [place] [address] [address] [facility] [address]  [phone]          Current Discharge Medication List      START taking these medications    Details   penicillin v potassium (VEETID) 500 mg tablet Take 1 Tablet by mouth two (2) times a day. Qty: 14 Tablet, Refills: 0  Start date: 1/17/2022      ibuprofen (MOTRIN) 800 mg tablet Take 1 Tablet by mouth every six (6) hours as needed for Pain for up to 7 days. Qty: 20 Tablet, Refills: 0  Start date: 1/17/2022, End date: 1/24/2022         STOP taking these medications       hydrocortisone (Anusol-HC) 2.5 % rectal cream Comments:   Reason for Stopping:         diflunisaL (DOLOBID) 500 mg tab Comments:   Reason for Stopping:         ondansetron (Zofran ODT) 4 mg disintegrating tablet Comments:   Reason for Stopping:         ondansetron (Zofran ODT) 4 mg disintegrating tablet Comments:   Reason for Stopping:               Procedures:  Procedures    Please note that this dictation was completed with Dragon, computer voice recognition software. Quite often unanticipated grammatical, syntax, homophones, and other interpretive errors are inadvertently transcribed by the computer software. Please disregard these errors. Additionally, please excuse any errors that have escaped final proofreading. Diagnosis     Clinical Impression:   1.  Dental infection

## 2022-01-20 ENCOUNTER — HOSPITAL ENCOUNTER (EMERGENCY)
Age: 42
Discharge: HOME | End: 2022-01-20
Attending: EMERGENCY MEDICINE
Payer: MEDICAID

## 2022-01-20 VITALS
OXYGEN SATURATION: 98 % | BODY MASS INDEX: 44.35 KG/M2 | DIASTOLIC BLOOD PRESSURE: 86 MMHG | TEMPERATURE: 98.8 F | SYSTOLIC BLOOD PRESSURE: 123 MMHG | HEART RATE: 84 BPM | RESPIRATION RATE: 16 BRPM | HEIGHT: 62 IN | WEIGHT: 241 LBS

## 2022-01-20 DIAGNOSIS — Z20.822 PERSON UNDER INVESTIGATION FOR COVID-19: Primary | ICD-10-CM

## 2022-01-20 DIAGNOSIS — M79.10 MYALGIA: ICD-10-CM

## 2022-01-20 DIAGNOSIS — R53.1 WEAKNESS: ICD-10-CM

## 2022-01-20 DIAGNOSIS — K08.89 PAIN, DENTAL: ICD-10-CM

## 2022-01-20 LAB
ATRIAL RATE: 85 BPM
CALCULATED P AXIS, ECG09: 31 DEGREES
CALCULATED R AXIS, ECG10: -6 DEGREES
CALCULATED T AXIS, ECG11: 17 DEGREES
DIAGNOSIS, 93000: NORMAL
GLUCOSE BLD STRIP.AUTO-MCNC: 107 MG/DL (ref 65–117)
P-R INTERVAL, ECG05: 136 MS
Q-T INTERVAL, ECG07: 324 MS
QRS DURATION, ECG06: 68 MS
QTC CALCULATION (BEZET), ECG08: 385 MS
SARS-COV-2, XPLCVT: NOT DETECTED
SERVICE CMNT-IMP: NORMAL
SOURCE, COVRS: NORMAL
VENTRICULAR RATE, ECG03: 85 BPM

## 2022-01-20 PROCEDURE — U0005 INFEC AGEN DETEC AMPLI PROBE: HCPCS

## 2022-01-20 PROCEDURE — 93005 ELECTROCARDIOGRAM TRACING: CPT

## 2022-01-20 PROCEDURE — 99284 EMERGENCY DEPT VISIT MOD MDM: CPT

## 2022-01-20 PROCEDURE — 82962 GLUCOSE BLOOD TEST: CPT

## 2022-01-20 PROCEDURE — 74011250637 HC RX REV CODE- 250/637: Performed by: EMERGENCY MEDICINE

## 2022-01-20 RX ORDER — ONDANSETRON 4 MG/1
4 TABLET, FILM COATED ORAL
Qty: 20 TABLET | Refills: 0 | Status: SHIPPED | OUTPATIENT
Start: 2022-01-20

## 2022-01-20 RX ORDER — ONDANSETRON 4 MG/1
4 TABLET, ORALLY DISINTEGRATING ORAL
Status: COMPLETED | OUTPATIENT
Start: 2022-01-20 | End: 2022-01-20

## 2022-01-20 RX ORDER — HYDROCODONE BITARTRATE AND ACETAMINOPHEN 5; 325 MG/1; MG/1
2 TABLET ORAL
Status: COMPLETED | OUTPATIENT
Start: 2022-01-20 | End: 2022-01-20

## 2022-01-20 RX ADMIN — ONDANSETRON 4 MG: 4 TABLET, ORALLY DISINTEGRATING ORAL at 06:24

## 2022-01-20 RX ADMIN — HYDROCODONE BITARTRATE AND ACETAMINOPHEN 2 TABLET: 5; 325 TABLET ORAL at 06:06

## 2022-01-20 NOTE — ED NOTES
While this RN was obtaining EKG, pt stated \"I can't do this. I need to go home. I'm tired. I want to go home and go to sleep. \" Pt agreed to finish EKG, get COVID swab and Norco. Pt requesting to be discharged. MD notified.

## 2022-01-20 NOTE — ED TRIAGE NOTES
Pt is AOx4. Respirations are even and unlabored. Skin is warm and dry. When Pt asked chief complaint pt states \"I don't know I just don't feel good. \" Pt reports had tooth extraction at [facility] earlier today, was seen here at [facility] for a tooth infection. Pt also reports head and neck pain. Pt states was taken to [facility] via EMS earlier but \"they were taking too long. \" When asking Pt about history she reports a previous stroke that was dx here as well. Pt just responds \"I don't know\" to assessment questions. Bed in lowest locked position. Call bell within reach. Emergency Department Nursing Plan of Care       The Nursing Plan of Care is developed from the Nursing assessment and Emergency Department Attending provider initial evaluation. The plan of care may be reviewed in the ED Provider note.     The Plan of Care was developed with the following considerations:   Patient / Family readiness to learn indicated by:verbalized understanding  Persons(s) to be included in education: patient  Barriers to Learning/Limitations:No    Signed     Gracy Melendez RN    1/20/2022   5:44 AM
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
## 2022-03-19 PROBLEM — F31.9 AFFECTIVE PSYCHOSIS, BIPOLAR (HCC): Status: ACTIVE | Noted: 2018-05-01

## 2022-03-19 PROBLEM — F12.10 MARIJUANA ABUSE: Status: ACTIVE | Noted: 2018-05-03

## 2022-03-19 PROBLEM — F29 PSYCHOSIS (HCC): Status: ACTIVE | Noted: 2018-05-02

## 2022-03-19 PROBLEM — F31.9 BIPOLAR DISORDER (HCC): Status: ACTIVE | Noted: 2018-05-03

## 2022-03-20 PROBLEM — R45.1 AGITATION: Status: ACTIVE | Noted: 2018-05-03

## 2022-03-20 PROBLEM — F60.2 ANTISOCIAL PERSONALITY DISORDER (HCC): Status: ACTIVE | Noted: 2018-05-03

## 2022-03-23 ENCOUNTER — HOSPITAL ENCOUNTER (EMERGENCY)
Age: 42
Discharge: HOME | End: 2022-03-23
Attending: EMERGENCY MEDICINE
Payer: MEDICAID

## 2022-03-23 VITALS
HEART RATE: 92 BPM | OXYGEN SATURATION: 99 % | SYSTOLIC BLOOD PRESSURE: 101 MMHG | RESPIRATION RATE: 18 BRPM | HEIGHT: 63 IN | TEMPERATURE: 97.5 F | DIASTOLIC BLOOD PRESSURE: 82 MMHG | WEIGHT: 228 LBS | BODY MASS INDEX: 40.4 KG/M2

## 2022-03-23 DIAGNOSIS — R11.2 NON-INTRACTABLE VOMITING WITH NAUSEA, UNSPECIFIED VOMITING TYPE: Primary | ICD-10-CM

## 2022-03-23 DIAGNOSIS — R21 RASH AND OTHER NONSPECIFIC SKIN ERUPTION: ICD-10-CM

## 2022-03-23 LAB
ANION GAP SERPL CALC-SCNC: 12 MMOL/L (ref 5–15)
BUN SERPL-MCNC: 14 MG/DL (ref 6–20)
BUN/CREAT SERPL: 16 (ref 12–20)
CALCIUM SERPL-MCNC: 9 MG/DL (ref 8.5–10.1)
CHLORIDE SERPL-SCNC: 107 MMOL/L (ref 97–108)
CO2 SERPL-SCNC: 24 MMOL/L (ref 21–32)
CREAT SERPL-MCNC: 0.89 MG/DL (ref 0.55–1.02)
GLUCOSE SERPL-MCNC: 95 MG/DL (ref 65–100)
POTASSIUM SERPL-SCNC: 3.5 MMOL/L (ref 3.5–5.1)
SODIUM SERPL-SCNC: 143 MMOL/L (ref 136–145)

## 2022-03-23 PROCEDURE — 80048 BASIC METABOLIC PNL TOTAL CA: CPT

## 2022-03-23 PROCEDURE — 96360 HYDRATION IV INFUSION INIT: CPT

## 2022-03-23 PROCEDURE — 99284 EMERGENCY DEPT VISIT MOD MDM: CPT

## 2022-03-23 PROCEDURE — 36415 COLL VENOUS BLD VENIPUNCTURE: CPT

## 2022-03-23 PROCEDURE — 74011250636 HC RX REV CODE- 250/636: Performed by: EMERGENCY MEDICINE

## 2022-03-23 RX ORDER — PROMETHAZINE HYDROCHLORIDE 25 MG/1
25 SUPPOSITORY RECTAL
Qty: 12 SUPPOSITORY | Refills: 0 | Status: SHIPPED | OUTPATIENT
Start: 2022-03-23 | End: 2022-03-30

## 2022-03-23 RX ORDER — HYDROCORTISONE 1 %
CREAM (GRAM) TOPICAL 2 TIMES DAILY
Qty: 30 G | Refills: 0 | Status: SHIPPED | OUTPATIENT
Start: 2022-03-23

## 2022-03-23 RX ADMIN — SODIUM CHLORIDE 1000 ML: 9 INJECTION, SOLUTION INTRAVENOUS at 18:15

## 2022-03-23 NOTE — Clinical Note
[facility] EMERGENCY DEPT  [address] [facility] [address] [phone]    Work/School Note    Date: 3/23/2022    To Whom It May concern:      Leo Reis was seen and treated today in the emergency room by the following provider(s):  Attending Provider: Ky Bowles MD.      Leo Reis is excused from work/school on 03/23/22. She is clear to return to work/school on 03/24/22.         Sincerely,          Siria Sloan MD
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
## 2022-03-23 NOTE — ED NOTES
Pt presents to ED ambulatory complaining of vomiting and dehydration since Sunday. Pt reports she has been taking zofran that her provider prescribed her but that it hasn't helped her. Pt states se wants a bag of IV fluids. Pt is noted to be tolerating coca-cola without vomiting. Pt is alert and oriented x 4, RR even and unlabored, skin is warm and dry. Assessment completed and pt updated on plan of care. Call bell in reach. Emergency Department Nursing Plan of Care       The Nursing Plan of Care is developed from the Nursing assessment and Emergency Department Attending provider initial evaluation. The plan of care may be reviewed in the ED Provider note.     The Plan of Care was developed with the following considerations:   Patient / Family readiness to learn indicated by:verbalized understanding  Persons(s) to be included in education: patient  Barriers to Learning/Limitations:No    Signed     Stephanie Win RN    3/23/2022   5:53 PM

## 2022-03-23 NOTE — ED NOTES
Discharge instructions discussed with patient. Patient expressed understanding of the instructions provided. Patient was awake and alert when she ambulated out of the ED at time ofdischarge.
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
## 2022-04-20 ENCOUNTER — APPOINTMENT (OUTPATIENT)
Dept: GENERAL RADIOLOGY | Age: 42
End: 2022-04-20
Attending: EMERGENCY MEDICINE
Payer: MEDICAID

## 2022-04-20 ENCOUNTER — HOSPITAL ENCOUNTER (EMERGENCY)
Age: 42
Discharge: LEFT BEFORE DISCHARGE | End: 2022-04-20
Attending: EMERGENCY MEDICINE
Payer: MEDICAID

## 2022-04-20 VITALS
TEMPERATURE: 98 F | HEART RATE: 72 BPM | BODY MASS INDEX: 41.71 KG/M2 | HEIGHT: 62 IN | RESPIRATION RATE: 16 BRPM | WEIGHT: 226.63 LBS | DIASTOLIC BLOOD PRESSURE: 54 MMHG | SYSTOLIC BLOOD PRESSURE: 108 MMHG | OXYGEN SATURATION: 100 %

## 2022-04-20 LAB
ALBUMIN SERPL-MCNC: 4 G/DL (ref 3.5–5)
ALBUMIN/GLOB SERPL: 1 {RATIO} (ref 1.1–2.2)
ALP SERPL-CCNC: 55 U/L (ref 45–117)
ALT SERPL-CCNC: 26 U/L (ref 12–78)
ANION GAP SERPL CALC-SCNC: 3 MMOL/L (ref 5–15)
AST SERPL-CCNC: 10 U/L (ref 15–37)
BASOPHILS # BLD: 0 K/UL (ref 0–0.1)
BASOPHILS NFR BLD: 0 % (ref 0–1)
BILIRUB SERPL-MCNC: 0.5 MG/DL (ref 0.2–1)
BNP SERPL-MCNC: 25 PG/ML
BUN SERPL-MCNC: 16 MG/DL (ref 6–20)
BUN/CREAT SERPL: 17 (ref 12–20)
CALCIUM SERPL-MCNC: 9.3 MG/DL (ref 8.5–10.1)
CHLORIDE SERPL-SCNC: 108 MMOL/L (ref 97–108)
CO2 SERPL-SCNC: 30 MMOL/L (ref 21–32)
CREAT SERPL-MCNC: 0.96 MG/DL (ref 0.55–1.02)
DIFFERENTIAL METHOD BLD: NORMAL
EOSINOPHIL # BLD: 0.1 K/UL (ref 0–0.4)
EOSINOPHIL NFR BLD: 1 % (ref 0–7)
ERYTHROCYTE [DISTWIDTH] IN BLOOD BY AUTOMATED COUNT: 12.9 % (ref 11.5–14.5)
GLOBULIN SER CALC-MCNC: 4 G/DL (ref 2–4)
GLUCOSE SERPL-MCNC: 84 MG/DL (ref 65–100)
HCT VFR BLD AUTO: 43.5 % (ref 35–47)
HGB BLD-MCNC: 13.5 G/DL (ref 11.5–16)
IMM GRANULOCYTES # BLD AUTO: 0 K/UL (ref 0–0.04)
IMM GRANULOCYTES NFR BLD AUTO: 0 % (ref 0–0.5)
LYMPHOCYTES # BLD: 2 K/UL (ref 0.8–3.5)
LYMPHOCYTES NFR BLD: 21 % (ref 12–49)
MCH RBC QN AUTO: 30.3 PG (ref 26–34)
MCHC RBC AUTO-ENTMCNC: 31 G/DL (ref 30–36.5)
MCV RBC AUTO: 97.8 FL (ref 80–99)
MONOCYTES # BLD: 0.6 K/UL (ref 0–1)
MONOCYTES NFR BLD: 6 % (ref 5–13)
NEUTS SEG # BLD: 6.8 K/UL (ref 1.8–8)
NEUTS SEG NFR BLD: 72 % (ref 32–75)
NRBC # BLD: 0 K/UL (ref 0–0.01)
NRBC BLD-RTO: 0 PER 100 WBC
PLATELET # BLD AUTO: 225 K/UL (ref 150–400)
PMV BLD AUTO: 10.3 FL (ref 8.9–12.9)
POTASSIUM SERPL-SCNC: 4.3 MMOL/L (ref 3.5–5.1)
PROT SERPL-MCNC: 8 G/DL (ref 6.4–8.2)
RBC # BLD AUTO: 4.45 M/UL (ref 3.8–5.2)
SODIUM SERPL-SCNC: 141 MMOL/L (ref 136–145)
TROPONIN-HIGH SENSITIVITY: 4 NG/L (ref 0–51)
WBC # BLD AUTO: 9.6 K/UL (ref 3.6–11)

## 2022-04-20 PROCEDURE — 85025 COMPLETE CBC W/AUTO DIFF WBC: CPT

## 2022-04-20 PROCEDURE — 71046 X-RAY EXAM CHEST 2 VIEWS: CPT

## 2022-04-20 PROCEDURE — 80053 COMPREHEN METABOLIC PANEL: CPT

## 2022-04-20 PROCEDURE — 94761 N-INVAS EAR/PLS OXIMETRY MLT: CPT

## 2022-04-20 PROCEDURE — 93005 ELECTROCARDIOGRAM TRACING: CPT

## 2022-04-20 PROCEDURE — 83880 ASSAY OF NATRIURETIC PEPTIDE: CPT

## 2022-04-20 PROCEDURE — 75810000275 HC EMERGENCY DEPT VISIT NO LEVEL OF CARE

## 2022-04-20 PROCEDURE — 36415 COLL VENOUS BLD VENIPUNCTURE: CPT

## 2022-04-20 PROCEDURE — 84484 ASSAY OF TROPONIN QUANT: CPT

## 2022-04-20 NOTE — Clinical Note
The patient has decided to leave against medical advice, because did not feel like waiting any longer. She has normal mental status and adequate capacity to make medical decisions. The patient refuses hospital admission and wants to be discharged. The risks have been explained to the patient, including worsening illness, chronic pain, permanent disability, and death. The benefits of admission have also been explained, including the availability and proximity of nurses, physicians, monitoring , diagnostic testing, and treatment. The patient was able to understand and state the risks and benefits of hospital admission. This was witnessed by nurse Angelica Castrejon and me. She had the opportunity to ask questions about her medical condition. The  patient was treated to the extent that she would allow, and knows that she may return for care at any time. Follow up has been discussed and arranged with Dr. Aga Salazar.

## 2022-04-20 NOTE — ED NOTES
Patient did not wish to wait in ER any longer. IV removed from [address] [place] [address].  States she will just look at [address] for results and come back if symptoms worsen

## 2022-04-22 LAB
ATRIAL RATE: 71 BPM
CALCULATED P AXIS, ECG09: 24 DEGREES
CALCULATED R AXIS, ECG10: 11 DEGREES
CALCULATED T AXIS, ECG11: 32 DEGREES
DIAGNOSIS, 93000: NORMAL
P-R INTERVAL, ECG05: 146 MS
Q-T INTERVAL, ECG07: 392 MS
QRS DURATION, ECG06: 68 MS
QTC CALCULATION (BEZET), ECG08: 425 MS
VENTRICULAR RATE, ECG03: 71 BPM

## 2022-11-20 ENCOUNTER — HOSPITAL ENCOUNTER (EMERGENCY)
Age: 42
Discharge: HOME | End: 2022-11-20
Attending: EMERGENCY MEDICINE
Payer: MEDICARE

## 2022-11-20 VITALS
WEIGHT: 190.26 LBS | HEART RATE: 87 BPM | TEMPERATURE: 98.5 F | BODY MASS INDEX: 34.8 KG/M2 | SYSTOLIC BLOOD PRESSURE: 102 MMHG | DIASTOLIC BLOOD PRESSURE: 73 MMHG | RESPIRATION RATE: 18 BRPM | OXYGEN SATURATION: 98 %

## 2022-11-20 DIAGNOSIS — F12.90 CANNABINOID HYPEREMESIS SYNDROME: Primary | ICD-10-CM

## 2022-11-20 DIAGNOSIS — R11.2 CANNABINOID HYPEREMESIS SYNDROME: Primary | ICD-10-CM

## 2022-11-20 DIAGNOSIS — E87.6 HYPOKALEMIA: ICD-10-CM

## 2022-11-20 LAB
ALBUMIN SERPL-MCNC: 4.4 G/DL (ref 3.5–5)
ALBUMIN/GLOB SERPL: 1.2 {RATIO} (ref 1.1–2.2)
ALP SERPL-CCNC: 61 U/L (ref 45–117)
ALT SERPL-CCNC: 21 U/L (ref 12–78)
AMPHET UR QL SCN: NEGATIVE
ANION GAP SERPL CALC-SCNC: 11 MMOL/L (ref 5–15)
APPEARANCE UR: ABNORMAL
AST SERPL-CCNC: 12 U/L (ref 15–37)
BACTERIA URNS QL MICRO: NEGATIVE /HPF
BARBITURATES UR QL SCN: NEGATIVE
BASOPHILS # BLD: 0 K/UL (ref 0–0.1)
BASOPHILS NFR BLD: 0 % (ref 0–1)
BENZODIAZ UR QL: NEGATIVE
BILIRUB SERPL-MCNC: 1.4 MG/DL (ref 0.2–1)
BILIRUB UR QL CFM: NEGATIVE
BUN SERPL-MCNC: 18 MG/DL (ref 6–20)
BUN/CREAT SERPL: 20 (ref 12–20)
CALCIUM SERPL-MCNC: 9.7 MG/DL (ref 8.5–10.1)
CANNABINOIDS UR QL SCN: POSITIVE
CHLORIDE SERPL-SCNC: 102 MMOL/L (ref 97–108)
CO2 SERPL-SCNC: 27 MMOL/L (ref 21–32)
COCAINE UR QL SCN: NEGATIVE
COLOR UR: ABNORMAL
CREAT SERPL-MCNC: 0.9 MG/DL (ref 0.55–1.02)
DIFFERENTIAL METHOD BLD: ABNORMAL
DRUG SCRN COMMENT,DRGCM: ABNORMAL
EOSINOPHIL # BLD: 0 K/UL (ref 0–0.4)
EOSINOPHIL NFR BLD: 0 % (ref 0–7)
EPITH CASTS URNS QL MICRO: ABNORMAL /LPF
ERYTHROCYTE [DISTWIDTH] IN BLOOD BY AUTOMATED COUNT: 12.8 % (ref 11.5–14.5)
FLUAV RNA SPEC QL NAA+PROBE: NOT DETECTED
FLUBV RNA SPEC QL NAA+PROBE: NOT DETECTED
GLOBULIN SER CALC-MCNC: 3.7 G/DL (ref 2–4)
GLUCOSE SERPL-MCNC: 101 MG/DL (ref 65–100)
GLUCOSE UR STRIP.AUTO-MCNC: NEGATIVE MG/DL
HCT VFR BLD AUTO: 44.2 % (ref 35–47)
HGB BLD-MCNC: 15 G/DL (ref 11.5–16)
HGB UR QL STRIP: NEGATIVE
IMM GRANULOCYTES # BLD AUTO: 0.1 K/UL (ref 0–0.04)
IMM GRANULOCYTES NFR BLD AUTO: 0 % (ref 0–0.5)
KETONES UR QL STRIP.AUTO: 80 MG/DL
LEUKOCYTE ESTERASE UR QL STRIP.AUTO: NEGATIVE
LYMPHOCYTES # BLD: 1.7 K/UL (ref 0.8–3.5)
LYMPHOCYTES NFR BLD: 15 % (ref 12–49)
MCH RBC QN AUTO: 30 PG (ref 26–34)
MCHC RBC AUTO-ENTMCNC: 33.9 G/DL (ref 30–36.5)
MCV RBC AUTO: 88.4 FL (ref 80–99)
METHADONE UR QL: NEGATIVE
MONOCYTES # BLD: 0.9 K/UL (ref 0–1)
MONOCYTES NFR BLD: 8 % (ref 5–13)
NEUTS SEG # BLD: 8.6 K/UL (ref 1.8–8)
NEUTS SEG NFR BLD: 77 % (ref 32–75)
NITRITE UR QL STRIP.AUTO: NEGATIVE
NRBC # BLD: 0 K/UL (ref 0–0.01)
NRBC BLD-RTO: 0 PER 100 WBC
OPIATES UR QL: NEGATIVE
PCP UR QL: NEGATIVE
PH UR STRIP: 6.5 [PH] (ref 5–8)
PLATELET # BLD AUTO: 290 K/UL (ref 150–400)
PMV BLD AUTO: 11.1 FL (ref 8.9–12.9)
POTASSIUM SERPL-SCNC: 3 MMOL/L (ref 3.5–5.1)
PROT SERPL-MCNC: 8.1 G/DL (ref 6.4–8.2)
PROT UR STRIP-MCNC: 30 MG/DL
RBC # BLD AUTO: 5 M/UL (ref 3.8–5.2)
RBC #/AREA URNS HPF: ABNORMAL /HPF (ref 0–5)
SARS-COV-2, COV2: NOT DETECTED
SODIUM SERPL-SCNC: 140 MMOL/L (ref 136–145)
SP GR UR REFRACTOMETRY: 1.02 (ref 1–1.03)
UA: UC IF INDICATED,UAUC: ABNORMAL
UROBILINOGEN UR QL STRIP.AUTO: 1 EU/DL (ref 0.2–1)
WBC # BLD AUTO: 11.3 K/UL (ref 3.6–11)
WBC URNS QL MICRO: ABNORMAL /HPF (ref 0–4)

## 2022-11-20 PROCEDURE — 74011250636 HC RX REV CODE- 250/636: Performed by: NURSE PRACTITIONER

## 2022-11-20 PROCEDURE — 36415 COLL VENOUS BLD VENIPUNCTURE: CPT

## 2022-11-20 PROCEDURE — 96374 THER/PROPH/DIAG INJ IV PUSH: CPT

## 2022-11-20 PROCEDURE — 99284 EMERGENCY DEPT VISIT MOD MDM: CPT

## 2022-11-20 PROCEDURE — 96375 TX/PRO/DX INJ NEW DRUG ADDON: CPT

## 2022-11-20 PROCEDURE — 96361 HYDRATE IV INFUSION ADD-ON: CPT

## 2022-11-20 PROCEDURE — 85025 COMPLETE CBC W/AUTO DIFF WBC: CPT

## 2022-11-20 PROCEDURE — 80307 DRUG TEST PRSMV CHEM ANLYZR: CPT

## 2022-11-20 PROCEDURE — 80053 COMPREHEN METABOLIC PANEL: CPT

## 2022-11-20 PROCEDURE — 87636 SARSCOV2 & INF A&B AMP PRB: CPT

## 2022-11-20 PROCEDURE — 81001 URINALYSIS AUTO W/SCOPE: CPT

## 2022-11-20 RX ORDER — DIPHENHYDRAMINE HYDROCHLORIDE 50 MG/ML
25 INJECTION, SOLUTION INTRAMUSCULAR; INTRAVENOUS
Status: COMPLETED | OUTPATIENT
Start: 2022-11-20 | End: 2022-11-20

## 2022-11-20 RX ORDER — ONDANSETRON 2 MG/ML
4 INJECTION INTRAMUSCULAR; INTRAVENOUS ONCE
Status: COMPLETED | OUTPATIENT
Start: 2022-11-20 | End: 2022-11-20

## 2022-11-20 RX ORDER — KETOROLAC TROMETHAMINE 30 MG/ML
30 INJECTION, SOLUTION INTRAMUSCULAR; INTRAVENOUS ONCE
Status: COMPLETED | OUTPATIENT
Start: 2022-11-20 | End: 2022-11-20

## 2022-11-20 RX ORDER — PROCHLORPERAZINE EDISYLATE 5 MG/ML
10 INJECTION INTRAMUSCULAR; INTRAVENOUS ONCE
Status: DISCONTINUED | OUTPATIENT
Start: 2022-11-20 | End: 2022-11-20

## 2022-11-20 RX ORDER — HALOPERIDOL 5 MG/ML
4 INJECTION INTRAMUSCULAR
Status: COMPLETED | OUTPATIENT
Start: 2022-11-20 | End: 2022-11-20

## 2022-11-20 RX ADMIN — DIPHENHYDRAMINE HYDROCHLORIDE 25 MG: 50 INJECTION, SOLUTION INTRAMUSCULAR; INTRAVENOUS at 20:13

## 2022-11-20 RX ADMIN — ONDANSETRON 4 MG: 2 INJECTION INTRAMUSCULAR; INTRAVENOUS at 19:08

## 2022-11-20 RX ADMIN — HALOPERIDOL LACTATE 4 MG: 5 INJECTION, SOLUTION INTRAMUSCULAR at 20:13

## 2022-11-20 RX ADMIN — KETOROLAC TROMETHAMINE 30 MG: 30 INJECTION, SOLUTION INTRAMUSCULAR; INTRAVENOUS at 19:15

## 2022-11-20 RX ADMIN — SODIUM CHLORIDE 1000 ML: 9 INJECTION, SOLUTION INTRAVENOUS at 20:13

## 2022-11-20 RX ADMIN — SODIUM CHLORIDE 1000 ML: 9 INJECTION, SOLUTION INTRAVENOUS at 19:08

## 2022-11-20 NOTE — ED NOTES
Patient here with c/o nausea vomiting. Patient alludes to flu and covid exposure. Patient however poor historian, very uncooperative and impatient with standard process and very demanding. Patient exact statement \"I don't feel good! I need something to drink! I need an IV! You need to give me some medicine right now! \"  Plan of care discussed, attempted to education patient on remaining NPO until seen by provider. Patient however remains demanding, stating \"You need to tell that doctor to get in her right now! I don't feel good, this ain't no joke! \"      Patient did moderately cooperate for placement of an IV. Emergency Department Nursing Plan of Care       The Nursing Plan of Care is developed from the Nursing assessment and Emergency Department Attending provider initial evaluation. The plan of care may be reviewed in the ED Provider note.     The Plan of Care was developed with the following considerations:   Patient / Family readiness to learn indicated by:verbalized understanding  Persons(s) to be included in education: patient  Barriers to Learning/Limitations:No    Signed     Drue Lefort, RN    11/20/2022   6:58 PM

## 2022-11-21 NOTE — DISCHARGE INSTRUCTIONS
It was a pleasure taking care of you at [facility] Emergency Department today. We know that when you come to [facility], you are entrusting us with your health, comfort, and safety. Our physicians and nurses honor that trust, and we truly appreciate the opportunity to care for you and your loved ones. We also value our feedback. If you receive a survey about your Emergency Department experience today, please fill it out. We care about our patients' feedback, and we listen to what you have to say. Thank you!

## 2022-11-21 NOTE — ED PROVIDER NOTES
EMERGENCY DEPARTMENT HISTORY AND PHYSICAL EXAM      Date: 2022  Patient Name: Chan Sheets    History of Presenting Illness     No chief complaint on file. History Provided By: Patient    Additional History (Context): Chan Sheets is a 40 y.o. female with  anxiety, hypertension  who presents with vomiting. Pt states her sx are due to flu exposure. She reports excessive abd pain associated with nausea and vomiting. Denies fever, chills, CP, SOB. She reports pain is unbearable and she been taking percocet with no relief. Denies vaginal or urinary sx. She reports feeling very dehydrated despite water intake. PCP: None    Current Outpatient Medications   Medication Sig Dispense Refill    hydrocortisone (CORTAID) 1 % topical cream Apply  to affected area two (2) times a day. use thin layer 30 g 0    ondansetron hcl (Zofran) 4 mg tablet Take 1 Tablet by mouth every eight (8) hours as needed for Nausea.  20 Tablet 0    acetaZOLAMIDE SR (DIAMOX) 500 mg capsule TAKE 1 CAPSULE BY MOUTH TWICE A DAY  11       Past History     Past Medical History:  Past Medical History:   Diagnosis Date    Anxiety     Hypertension     Other ill-defined conditions(799.89)     Hydrocephalus - mild    Psychiatric disorder     Anxiety       Past Surgical History:  Past Surgical History:   Procedure Laterality Date    HX  SECTION  ,03    x2    HX HEENT      LP to relieve right eye pressure    HX HYSTERECTOMY      Partial    HX LAPAROSCOPIC SUPRACERVICAL HYSTERECTOMY  14    HX OTHER SURGICAL  2013    Spinal Tap    HX TUBAL LIGATION         Family History:  Family History   Problem Relation Age of Onset    Diabetes Maternal Grandmother     Stroke Maternal Grandmother        Social History:  Social History     Tobacco Use    Smoking status: Never    Smokeless tobacco: Never   Substance Use Topics    Alcohol use: Yes     Comment: occasional    Drug use: Yes     Types: Marijuana       Allergies:  No Known Allergies      Review of Systems   Review of Systems   Constitutional:  Negative for appetite change, chills, fatigue and fever. HENT:  Negative for congestion, ear pain, rhinorrhea, sneezing and sore throat. Eyes:  Negative for pain and itching. Respiratory:  Negative for cough, chest tightness, shortness of breath and wheezing. Cardiovascular:  Negative for chest pain, palpitations and leg swelling. Gastrointestinal:  Positive for abdominal pain, nausea and vomiting. Negative for constipation and diarrhea. Genitourinary:  Negative for dysuria, frequency and urgency. Musculoskeletal:  Negative for arthralgias, back pain, joint swelling and neck pain. Skin:  Negative for color change and rash. Neurological:  Negative for dizziness, numbness and headaches. All other systems reviewed and are negative. Physical Exam     Vitals:    11/20/22 1821   BP: 102/73   Pulse: 87   Resp: 18   Temp: 98.5 °F (36.9 °C)   SpO2: 98%   Weight: 86.3 kg (190 lb 4.1 oz)     Physical Exam  Vitals and nursing note reviewed. Constitutional:       General: She is in acute distress (tearful and pacing around room). Appearance: She is well-developed. She is not ill-appearing. HENT:      Head: Normocephalic and atraumatic. Right Ear: Tympanic membrane and ear canal normal.      Left Ear: Tympanic membrane and ear canal normal.      Nose: Nose normal.      Mouth/Throat:      Mouth: Mucous membranes are moist.      Pharynx: Oropharynx is clear. No oropharyngeal exudate or posterior oropharyngeal erythema. Eyes:      Extraocular Movements: Extraocular movements intact. Conjunctiva/sclera: Conjunctivae normal.      Pupils: Pupils are equal, round, and reactive to light. Cardiovascular:      Rate and Rhythm: Normal rate and regular rhythm. Pulses: Normal pulses. Heart sounds: Normal heart sounds. Pulmonary:      Effort: Pulmonary effort is normal.      Breath sounds: Normal breath sounds. Abdominal:      General: Bowel sounds are normal. There is no distension. Palpations: Abdomen is soft. There is no mass. Tenderness: There is abdominal tenderness. There is no right CVA tenderness, left CVA tenderness or guarding. Musculoskeletal:      Cervical back: Normal range of motion and neck supple. Skin:     General: Skin is warm and dry. Neurological:      Mental Status: She is alert and oriented to person, place, and time. Diagnostic Study Results     Labs -   No results found for this or any previous visit (from the past 12 hour(s)). Radiologic Studies -   No orders to display     CT Results  (Last 48 hours)      None          CXR Results  (Last 48 hours)      None              Medical Decision Making   I am the first provider for this patient. I reviewed the vital signs, available nursing notes, past medical history, past surgical history, family history and social history. Vital Signs-Reviewed the patient's vital signs     Records Reviewed: Nursing Notes, Old Medical Records, and Previous Laboratory Studies    40 old female presenting with vomiting exhibiting generalized abdominal tenderness but no guarding or rigidity on exam.  Patient is yelling in the room and patient stating that she needs pain medication. I am unsure if this may likely be flu symptom the patient states she had a recent exposure. Advised we will need to obtain flu COVID swab. In addition will rehydrate with IV fluids along with Zofran for management. Advised no further narcotics until cause of symptoms is addressed. Differential diagnosis include electrolyte imbalance, dehydration, gastroenteritis, cannabis hyperemesis  ED Course:   ED Course as of 11/21/22 1743   Sun Nov 20, 2022 2101 Progress Note:   Pt is resting at this time. Awaiting UDS to evaluate if [facility] is present. If negative then will consider CT of abd.    Plan to supplement with potassium 40 mEq [NA]   2117 Progress Note:   Pt reports sx resolved and is ready fr discharge. UDS + for THC. Likely cannabis hyperemesis. Plan to d/c with PCP follow up if sx persist. [NA]      ED Course User Index  [NA] Kathleen Clay NP         Disposition:  Discharge     DISCHARGE NOTE:   Pt has been reexamined. Patient has no new complaints, changes, or physical findings. Care plan outlined and precautions discussed. All of pt's questions and concerns were addressed. Patient was instructed and agrees to follow up with PCP, as well as to return to the ED upon further deterioration. Patient is ready to go home. Follow-up Information       Follow up With Specialties Details Why [address]  Call in 1 week As needed, If symptoms worsen [address]  [place], [address]  [phone]            Discharge Medication List as of 11/20/2022  9:22 PM            Diagnosis     Clinical Impression:   1. Cannabinoid hyperemesis syndrome    2.  Hypokalemia

## 2024-05-20 ENCOUNTER — HOSPITAL ENCOUNTER (EMERGENCY)
Facility: HOSPITAL | Age: 44
Discharge: HOME | End: 2024-05-20
Attending: STUDENT IN AN ORGANIZED HEALTH CARE EDUCATION/TRAINING PROGRAM

## 2024-05-20 VITALS
SYSTOLIC BLOOD PRESSURE: 130 MMHG | WEIGHT: 150 LBS | HEIGHT: 64 IN | BODY MASS INDEX: 25.61 KG/M2 | RESPIRATION RATE: 16 BRPM | OXYGEN SATURATION: 99 % | HEART RATE: 92 BPM | TEMPERATURE: 98.2 F | DIASTOLIC BLOOD PRESSURE: 73 MMHG

## 2024-05-20 DIAGNOSIS — H10.32 ACUTE BACTERIAL CONJUNCTIVITIS OF LEFT EYE: Primary | ICD-10-CM

## 2024-05-20 PROCEDURE — 99283 EMERGENCY DEPT VISIT LOW MDM: CPT

## 2024-05-20 RX ORDER — ERYTHROMYCIN 5 MG/G
OINTMENT OPHTHALMIC
Qty: 3.5 G | Refills: 0 | Status: SHIPPED | OUTPATIENT
Start: 2024-05-20 | End: 2024-05-30

## 2024-05-20 ASSESSMENT — PAIN DESCRIPTION - DESCRIPTORS: DESCRIPTORS: SORE;DULL

## 2024-05-20 ASSESSMENT — PAIN DESCRIPTION - FREQUENCY: FREQUENCY: CONTINUOUS

## 2024-05-20 ASSESSMENT — PAIN DESCRIPTION - PAIN TYPE: TYPE: ACUTE PAIN

## 2024-05-20 ASSESSMENT — PAIN DESCRIPTION - ORIENTATION: ORIENTATION: LEFT

## 2024-05-20 ASSESSMENT — PAIN SCALES - GENERAL: PAINLEVEL_OUTOF10: 5

## 2024-05-20 ASSESSMENT — PAIN DESCRIPTION - LOCATION: LOCATION: EYE

## 2024-05-20 ASSESSMENT — PAIN - FUNCTIONAL ASSESSMENT: PAIN_FUNCTIONAL_ASSESSMENT: 0-10

## 2024-05-20 NOTE — ED PROVIDER NOTES
[facility] EMERGENCY DEPT  EMERGENCY DEPARTMENT ENCOUNTER       Pt Name: Jess Galarza  MRN: [id number]  Birthdate 1980  Date of evaluation: 2024  Provider: Montana Metz MD   PCP: No primary care provider on file.  Note Started: 8:24 AM EDT 24     CHIEF COMPLAINT       Chief Complaint   Patient presents with    Eye Pain     HISTORY OF PRESENT ILLNESS: 1 or more elements      History From: patient, History limited by: none     Jess Galarza is a 44 y.o. female with past medical history of below presents to the emergency department with left eye redness and pain.  When she wakes up in the morning her eye is stuck shut due to yellow discharge.  She reports this has been going on for the past few days.  She has no vision changes, no difficulty with moving her eye.  She reports that she was at [employer] until the end of lat week, but she is not aware of any sick contact specifically that she was around.  She does not wear contacts.    Please See MDM for Additional Details of the HPI/PMH  Nursing Notes were all reviewed and agreed with or any disagreements were addressed in the HPI.     REVIEW OF SYSTEMS      Positives and Pertinent negatives as per HPI.    PAST HISTORY     Past Medical History:  Past Medical History:   Diagnosis Date    Anxiety     Hypertension     Other ill-defined conditions(799.89)     Hydrocephalus - mild    Psychiatric disorder     Anxiety       Past Surgical History:  Past Surgical History:   Procedure Laterality Date     SECTION  ,03    x2    HEENT      LP to relieve right eye pressure    HYSTERECTOMY (CERVIX STATUS UNKNOWN)      Partial    OTHER SURGICAL HISTORY  2013    Spinal Tap    PARTIAL HYSTERECTOMY (CERVIX NOT REMOVED)  14    TUBAL LIGATION         Family History:  Family History   Problem Relation Age of Onset    Diabetes Maternal Grandmother     Stroke Maternal Grandmother        Social History:  Social History     Tobacco Use    Smoking

## 2024-05-20 NOTE — DISCHARGE INSTRUCTIONS
Please make a followup with your primary care physician and eye doctor if symptoms.  If you have any new or worsening concerning medical symptoms please return to the emergency department.

## 2024-05-20 NOTE — ED NOTES
Pt presents ambulatory to ED complaining of left sided eye pain with redness and drainage x2 days. Pt reports drainage has been clear and yellow per pt. Pt denies use of contact lenses. Pt reports doing warm compresses at home and using benadryl without relief. Pt is alert and oriented x 4, RR even and unlabored, skin is warm and dry. Assessment completed and pt updated on plan of care.        Emergency Department Nursing Plan of Care        The Nursing Plan of Care is developed from the Nursing assessment and Emergency Department Attending provider initial evaluation.  The plan of care may be reviewed in the “ED Provider note”.